# Patient Record
Sex: FEMALE | Race: BLACK OR AFRICAN AMERICAN | Employment: FULL TIME | URBAN - METROPOLITAN AREA
[De-identification: names, ages, dates, MRNs, and addresses within clinical notes are randomized per-mention and may not be internally consistent; named-entity substitution may affect disease eponyms.]

---

## 2017-01-26 ENCOUNTER — OFFICE VISIT (OUTPATIENT)
Dept: FAMILY MEDICINE CLINIC | Age: 57
End: 2017-01-26

## 2017-01-26 VITALS
HEART RATE: 86 BPM | DIASTOLIC BLOOD PRESSURE: 78 MMHG | WEIGHT: 206 LBS | TEMPERATURE: 98.3 F | BODY MASS INDEX: 35.17 KG/M2 | HEIGHT: 64 IN | SYSTOLIC BLOOD PRESSURE: 134 MMHG

## 2017-01-26 DIAGNOSIS — I10 ESSENTIAL HYPERTENSION WITH GOAL BLOOD PRESSURE LESS THAN 130/85: ICD-10-CM

## 2017-01-26 DIAGNOSIS — J45.909 UNCOMPLICATED ASTHMA, UNSPECIFIED ASTHMA SEVERITY: Primary | ICD-10-CM

## 2017-01-26 DIAGNOSIS — E03.9 ACQUIRED HYPOTHYROIDISM: ICD-10-CM

## 2017-01-26 RX ORDER — ALBUTEROL SULFATE 90 UG/1
1 AEROSOL, METERED RESPIRATORY (INHALATION)
Qty: 1 INHALER | Refills: 3 | Status: SHIPPED | OUTPATIENT
Start: 2017-01-26 | End: 2017-03-30 | Stop reason: SDUPTHER

## 2017-01-26 RX ORDER — AMLODIPINE BESYLATE 10 MG/1
TABLET ORAL
Qty: 90 TAB | Refills: 0 | Status: SHIPPED | OUTPATIENT
Start: 2017-01-26 | End: 2017-03-30 | Stop reason: SDUPTHER

## 2017-01-26 NOTE — PROGRESS NOTES
HISTORY OF PRESENT ILLNESS  Kanchan Newell is a 64 y.o. female. HPI Comments: 1. Asthma-- using albuterol bid, on last canister of amBX and lost her insurance. 2.  Htn, taking med as directed. 3.  Hypothyroidism, taking med as directed. Hypertension      Thyroid Problem         ROS    Physical Exam   Constitutional: She appears well-developed and well-nourished. No distress. Wt. up   Neck: No thyromegaly present. Cardiovascular: Normal rate, regular rhythm and normal heart sounds. Exam reveals no gallop and no friction rub. No murmur heard. Pulmonary/Chest: Breath sounds normal.   Musculoskeletal: She exhibits no edema. ASSESSMENT and PLAN  htn-- continue current, good control. F/u 3 months, work on wt loss. Asthma-- will get preventive from [facility] now-- change to advair, and will get albuterol through [facility] also. Hypothyroidism-- doesn't need blood draw yet.

## 2017-01-26 NOTE — PROGRESS NOTES
Coordination of Care  1. Have you been to the ER, urgent care clinic since your last visit? Hospitalized since your last visit? No    2. Have you seen or consulted any other health care providers outside of the [address] [place] [address] since your last visit? Include any pap smears or colon screening. No    Medications  Medication Reconciliation Performed: No  Patient does need refills     Learning Assessment Complete?  yes

## 2017-01-26 NOTE — ACP (ADVANCE CARE PLANNING)
Discussed and recommended Honoring Choices. She is interested in participating along with a sister who lives in another state. Will have [employer] contact her.

## 2017-02-01 ENCOUNTER — TELEPHONE (OUTPATIENT)
Dept: FAMILY MEDICINE CLINIC | Age: 57
End: 2017-02-01

## 2017-03-20 RX ORDER — MOMETASONE FUROATE AND FORMOTEROL FUMARATE DIHYDRATE 100; 5 UG/1; UG/1
AEROSOL RESPIRATORY (INHALATION)
Qty: 1 INHALER | Refills: 11 | Status: SHIPPED | OUTPATIENT
Start: 2017-03-20 | End: 2017-03-30 | Stop reason: SDUPTHER

## 2017-03-30 ENCOUNTER — OFFICE VISIT (OUTPATIENT)
Dept: FAMILY MEDICINE CLINIC | Age: 57
End: 2017-03-30

## 2017-03-30 VITALS
DIASTOLIC BLOOD PRESSURE: 79 MMHG | WEIGHT: 211 LBS | TEMPERATURE: 98.1 F | HEART RATE: 74 BPM | BODY MASS INDEX: 36.02 KG/M2 | SYSTOLIC BLOOD PRESSURE: 132 MMHG | HEIGHT: 64 IN

## 2017-03-30 DIAGNOSIS — J45.909 UNCOMPLICATED ASTHMA, UNSPECIFIED ASTHMA SEVERITY: ICD-10-CM

## 2017-03-30 DIAGNOSIS — L30.8 OTHER ECZEMA: Primary | ICD-10-CM

## 2017-03-30 DIAGNOSIS — I10 ESSENTIAL HYPERTENSION WITH GOAL BLOOD PRESSURE LESS THAN 130/85: ICD-10-CM

## 2017-03-30 DIAGNOSIS — E03.9 ACQUIRED HYPOTHYROIDISM: ICD-10-CM

## 2017-03-30 RX ORDER — ALBUTEROL SULFATE 0.83 MG/ML
2.5 SOLUTION RESPIRATORY (INHALATION)
Qty: 24 EACH | Refills: 0 | Status: SHIPPED | OUTPATIENT
Start: 2017-03-30

## 2017-03-30 RX ORDER — AMLODIPINE BESYLATE 10 MG/1
TABLET ORAL
Qty: 90 TAB | Refills: 3 | Status: SHIPPED | OUTPATIENT
Start: 2017-03-30 | End: 2018-04-28 | Stop reason: SDUPTHER

## 2017-03-30 RX ORDER — ALBUTEROL SULFATE 90 UG/1
1 AEROSOL, METERED RESPIRATORY (INHALATION)
Qty: 1 INHALER | Refills: 3 | Status: SHIPPED | OUTPATIENT
Start: 2017-03-30 | End: 2020-08-06 | Stop reason: SDUPTHER

## 2017-03-30 RX ORDER — TRIAMCINOLONE ACETONIDE 0.25 MG/G
CREAM TOPICAL 2 TIMES DAILY
Qty: 15 G | Refills: 5 | Status: SHIPPED | OUTPATIENT
Start: 2017-03-30 | End: 2018-05-08 | Stop reason: SDUPTHER

## 2017-03-30 RX ORDER — LEVOTHYROXINE SODIUM 88 UG/1
TABLET ORAL
Qty: 90 TAB | Refills: 3 | Status: SHIPPED | OUTPATIENT
Start: 2017-03-30 | End: 2018-04-03 | Stop reason: SDUPTHER

## 2017-03-30 RX ORDER — FLUOCINONIDE 0.5 MG/G
CREAM TOPICAL 2 TIMES DAILY
Qty: 60 G | Refills: 1 | Status: SHIPPED | OUTPATIENT
Start: 2017-03-30 | End: 2017-06-01 | Stop reason: SDUPTHER

## 2017-03-30 RX ORDER — ALBUTEROL SULFATE 90 UG/1
1 AEROSOL, METERED RESPIRATORY (INHALATION)
Qty: 1 INHALER | Refills: 5 | Status: SHIPPED | OUTPATIENT
Start: 2017-03-30 | End: 2020-06-18 | Stop reason: SDUPTHER

## 2017-03-30 RX ORDER — MOMETASONE FUROATE 1 MG/G
CREAM TOPICAL
Qty: 60 G | Refills: 1 | Status: SHIPPED | OUTPATIENT
Start: 2017-03-30 | End: 2020-08-06 | Stop reason: SDUPTHER

## 2017-03-30 NOTE — PROGRESS NOTES
HISTORY OF PRESENT ILLNESS  Fili Kingsley is a 62 y.o. female. HPI Comments: Having some difficulties with her asthma, and is almost completely out of Dulera. Would like to be switched to Advair when possible. Using meds correctly. Has used albuterol once today. Has been unable to get into [facility] to refill steroid creams. Uses lidex per derm on trunk, triamcinolone on area outside vagina prn, and elocon occas on face with breakout. No current skin problems. Is sensitive to a lot of foods and trying to avoid these. Asthma     Hypertension          ROS    Physical Exam   Constitutional: She appears well-developed and well-nourished. No distress. Wt up from last visit. Neck: No thyromegaly present. Cardiovascular: Normal rate, regular rhythm and normal heart sounds. Pulmonary/Chest: No respiratory distress. She has wheezes. She has no rales. She exhibits no tenderness. Musculoskeletal: She exhibits no edema. ASSESSMENT and PLAN  1. Asthma-- get albuterol and dulera through [facility], pt to fill out TPC form and then will switch from Zhang Brightly to Advair. 2.  Eczema/Atopy-- refilled meds. To continue to try to reach [facility]. 3.  htn and hypothyroidism stable. 4. HM-- needs wt loss, needs [facility] for pap.

## 2017-03-30 NOTE — PROGRESS NOTES
[facility] pharmacy handout given and explained to patient. Patient plans to  at [facility] location, requesting stat fill of Rx as requested by Dr Gabriela Helms which was noted on fax cover sheet. F/S faxed to [facility]. New Nebulizer tubing kit given to pt per provider.

## 2017-03-30 NOTE — PROGRESS NOTES
Coordination of Care  1. Have you been to the ER, urgent care clinic since your last visit? Hospitalized since your last visit? No    2. Have you seen or consulted any other health care providers outside of the [facility] since your last visit? Include any pap smears or colon screening. No    Medications  Medication Reconciliation Performed: yes  Patient does need refills     Learning Assessment Complete?  yes

## 2017-06-01 ENCOUNTER — OFFICE VISIT (OUTPATIENT)
Dept: FAMILY MEDICINE CLINIC | Age: 57
End: 2017-06-01

## 2017-06-01 VITALS
BODY MASS INDEX: 37.51 KG/M2 | DIASTOLIC BLOOD PRESSURE: 79 MMHG | WEIGHT: 217 LBS | HEART RATE: 92 BPM | SYSTOLIC BLOOD PRESSURE: 143 MMHG | TEMPERATURE: 98.2 F

## 2017-06-01 DIAGNOSIS — L20.9 ATOPIC DERMATITIS, UNSPECIFIED TYPE: Primary | ICD-10-CM

## 2017-06-01 DIAGNOSIS — L03.113 CELLULITIS OF RIGHT UPPER EXTREMITY: ICD-10-CM

## 2017-06-01 RX ORDER — PREDNISONE 10 MG/1
TABLET ORAL
Qty: 21 TAB | Refills: 0 | Status: SHIPPED | OUTPATIENT
Start: 2017-06-01 | End: 2017-07-20

## 2017-06-01 RX ORDER — DOXYCYCLINE 100 MG/1
100 TABLET ORAL 2 TIMES DAILY
Qty: 14 TAB | Refills: 0 | Status: SHIPPED | OUTPATIENT
Start: 2017-06-01 | End: 2017-06-08

## 2017-06-01 RX ORDER — FLUOCINONIDE 0.5 MG/G
CREAM TOPICAL 2 TIMES DAILY
Qty: 60 G | Refills: 3 | Status: SHIPPED | OUTPATIENT
Start: 2017-06-01 | End: 2020-06-18

## 2017-06-01 RX ORDER — MUPIROCIN 20 MG/G
OINTMENT TOPICAL DAILY
Qty: 22 G | Refills: 1 | Status: SHIPPED | OUTPATIENT
Start: 2017-06-01 | End: 2018-04-02

## 2017-06-01 NOTE — MR AVS SNAPSHOT
Visit Information Date & Time Provider Department Dept. Phone Encounter #  
 6/1/2017  1:35 PM Matthew Yee [facility] [phone] [id number] Follow-up Instructions Return if symptoms worsen or fail to improve. Upcoming Health Maintenance Date Due FOBT Q 1 YEAR AGE 50-75 8/1/2017 INFLUENZA AGE 9 TO ADULT 8/1/2017 PAP AKA CERVICAL CYTOLOGY 11/25/2017 BREAST CANCER SCRN MAMMOGRAM 12/14/2018 DTaP/Tdap/Td series (2 - Td) 1/1/2025 Allergies as of 6/1/2017  Review Complete On: 6/1/2017 By: Matthew Yee NP Severity Noted Reaction Type Reactions Latex  03/20/2015    Rash Seafood [Shellfish Containing Products] High 02/23/2011    Angioedema THROAT SWELLS Tree Nut High 06/13/2013    Anaphylaxis Throat swelling Lisinopril Medium 02/18/2011    Not Reported This Time LIPS SWELL Bactrim [Sulfamethoprim Ds]  02/23/2011    Other (comments) BLISTERS ON HANDS AND FEET Banana  11/26/2014   Topical Other (comments), Angioedema  
 mouth became red and itchy. Iodine  03/02/2011   Side Effect Other (comments) Created bumps, soreness-betadine Lisinopril  11/25/2010    Swelling Angioedema on 11/25/2010 Povidone  07/26/2013    Rash  
 Sulfa (Sulfonamide Antibiotics)  02/18/2011   Side Effect Rash, Itching Current Immunizations  Reviewed on 2/18/2014 Name Date Influenza High Dose Vaccine PF 9/1/2015 Influenza Vaccine Whole 11/6/2009 Pneumococcal Polysaccharide (PPSV-23) 9/24/2014 11:40 AM  
  
 Not reviewed this visit You Were Diagnosed With   
  
 Codes Comments Atopic dermatitis, unspecified type    -  Primary ICD-10-CM: L20.9 ICD-9-CM: 691.8 Cellulitis of right upper extremity     ICD-10-CM: L03.113 ICD-9-CM: 270. 3 Vitals  BP Pulse Temp Weight(growth percentile) LMP BMI  
 143/79 (BP 1 Location: Left arm, BP Patient Position: Sitting) 92 98.2 °F (36.8 °C) (Oral) 217 lb (98.4 kg) 10/20/2014 37.51 kg/m2 OB Status Smoking Status Postmenopausal Never Smoker BMI and BSA Data Body Mass Index Body Surface Area  
 37.51 kg/m 2 2.1 m 2 Preferred Pharmacy Pharmacy Name Phone [facility] [address] [place], [place] - [address] [place] [address] Adriana De La Fuente [phone] Your Updated Medication List  
  
   
This list is accurate as of: 6/1/17  2:38 PM.  Always use your most recent med list.  
  
  
  
  
 * albuterol 90 mcg/actuation inhaler Commonly known as:  VENTOLIN HFA Take 1 Puff by inhalation every six (6) hours as needed for Wheezing. * albuterol 2.5 mg /3 mL (0.083 %) nebulizer solution Commonly known as:  PROVENTIL VENTOLIN  
3 mL by Nebulization route every four (4) hours as needed for Wheezing. * albuterol 90 mcg/actuation inhaler Commonly known as:  VENTOLIN HFA Take 1 Puff by inhalation every four (4) hours as needed for Wheezing. Ventolin so her insurance will cover. amLODIPine 10 mg tablet Commonly known as:  Tamyard Kyra TAKE ONE TABLET BY MOUTH DAILY FOR HYPERTENSION  
  
 doxycycline 100 mg tablet Commonly known as:  ADOXA Take 1 Tab by mouth two (2) times a day for 7 days. fluocinoNIDE 0.05 % topical cream  
Commonly known as:  LIDEX Apply  to affected area two (2) times a day. inhalational spacing device Commonly known as:  POCKET CHAMBER Use as directed with albuterol inhaler prn  
  
 levothyroxine 88 mcg tablet Commonly known as:  SYNTHROID  
TAKE ONE TABLET BY MOUTH ONCE DAILY  
  
 mometasone 0.1 % topical cream  
Commonly known as:  Caridad Kellogg Apply as directed. mometasone-formoterol 100-5 mcg/actuation HFA inhaler Commonly known as:  Shalom Jane INHALE TWO PUFFS BY MOUTH TWICE DAILY  
  
 montelukast 10 mg tablet Commonly known as:  SINGULAIR  
TAKE ONE TABLET BY MOUTH ONCE DAILY MULTIPLE VITAMIN PO Take 1 Tab by mouth daily (with breakfast). mupirocin 2 % ointment Commonly known as:  [employer] Apply  to affected area daily. naproxen 500 mg tablet Commonly known as:  NAPROSYN Take 1 Tab by mouth two (2) times daily (with meals). Prn pain * NASACORT AQ 55 mcg nasal inhaler Generic drug:  triamcinolone 2 Sprays daily. * triamcinolone acetonide 0.025 % topical cream  
Commonly known as:  KENALOG Apply  to affected area two (2) times a day. use thin layer  
  
 predniSONE 10 mg tablet Commonly known as:  Sofía Colla Take 20mg PO BID x 3 days then 10mg PO BID x 3 days then 10mg PO daily x 3 days then stop  
  
 vit B Cmplx 3-FA-Vit C-Biotin 1- mg-mg-mcg tablet Commonly known as:  NEPHRO JEFFERY RX Take 1 Tab by mouth daily. ZyrTEC 10 mg tablet Generic drug:  cetirizine Take  by mouth. * Notice: This list has 5 medication(s) that are the same as other medications prescribed for you. Read the directions carefully, and ask your doctor or other care provider to review them with you. Prescriptions Sent to Pharmacy Refills  
 mupirocin (BACTROBAN) 2 % ointment 1 Sig: Apply  to affected area daily. Class: Normal  
 Pharmacy: [address] Ph #: [phone] Route: Topical  
 doxycycline (ADOXA) 100 mg tablet 0 Sig: Take 1 Tab by mouth two (2) times a day for 7 days. Class: Normal  
 Pharmacy: [address] Ph #: [phone] Route: Oral  
 predniSONE (DELTASONE) 10 mg tablet 0 Sig: Take 20mg PO BID x 3 days then 10mg PO BID x 3 days then 10mg PO daily x 3 days then stop Class: Normal  
 Pharmacy: [address] [place] [address] Ph #: [phone]  
 fluocinoNIDE (LIDEX) 0.05 % topical cream 3 Sig: Apply  to affected area two (2) times a day.   
 Class: Normal  
 Pharmacy: [address], [address] Ph #: [phone] Route: Topical  
  
Follow-up Instructions Return if symptoms worsen or fail to improve. Patient Instructions Cellulitis: Care Instructions Your Care Instructions Cellulitis is a skin infection. It often occurs after a break in the skin from a scrape, cut, bite, or puncture, or after a rash. The doctor has checked you carefully, but problems can develop later. If you notice any problems or new symptoms, get medical treatment right away. Follow-up care is a key part of your treatment and safety. Be sure to make and go to all appointments, and call your doctor if you are having problems. It's also a good idea to know your test results and keep a list of the medicines you take. How can you care for yourself at home? · Take your antibiotics as directed. Do not stop taking them just because you feel better. You need to take the full course of antibiotics. · Prop up the infected area on pillows to reduce pain and swelling. Try to keep the area above the level of your heart as often as you can. · If your doctor told you how to care for your wound, follow your doctor's instructions. If you did not get instructions, follow this general advice: ¨ Wash the wound with clean water 2 times a day. Don't use hydrogen peroxide or alcohol, which can slow healing. ¨ You may cover the wound with a thin layer of petroleum jelly, such as Vaseline, and a nonstick bandage. ¨ Apply more petroleum jelly and replace the bandage as needed. · Be safe with medicines. Take pain medicines exactly as directed. ¨ If the doctor gave you a prescription medicine for pain, take it as prescribed. ¨ If you are not taking a prescription pain medicine, ask your doctor if you can take an over-the-counter medicine. To prevent cellulitis in the future · Try to prevent cuts, scrapes, or other injuries to your skin. Cellulitis most often occurs where there is a break in the skin. · If you get a scrape, cut, mild burn, or bite, wash the wound with clean water as soon as you can to help avoid infection. Don't use hydrogen peroxide or alcohol, which can slow healing. · If you have swelling in your legs (edema), support stockings and good skin care may help prevent leg sores and cellulitis. · Take care of your feet, especially if you have diabetes or other conditions that increase the risk of infection. Wear shoes and socks. Do not go barefoot. If you have athlete's foot or other skin problems on your feet, talk to your doctor about how to treat them. When should you call for help? Call your doctor now or seek immediate medical care if: 
· You have signs that your infection is getting worse, such as: 
¨ Increased pain, swelling, warmth, or redness. ¨ Red streaks leading from the area. ¨ Pus draining from the area. ¨ A fever. · You get a rash. Watch closely for changes in your health, and be sure to contact your doctor if: 
· You are not getting better after 1 day (24 hours). · You do not get better as expected. Where can you learn more? Go to http://suellenWalkSourceneena.info/. Dayron Canela in the search box to learn more about \"Cellulitis: Care Instructions. \" Current as of: October 13, 2016 Content Version: 11.2 © 8404-5061 [employer]. Care instructions adapted under license by [employer] (which disclaims liability or warranty for this information). If you have questions about a medical condition or this instruction, always ask your healthcare professional. [place] [address] any warranty or liability for your use of this information. Introducing [facility]! Cinthia Crisostomo introduces [employer] patient portal. Now you can access parts of your medical record, email your doctor's office, and request medication refills online. 1. In your internet browser, go to [email or web address] 2. Click on the First Time User? Click Here link in the Sign In box. You will see the New Member Sign Up page. 3. Enter your PetHub Access Code exactly as it appears below. You will not need to use this code after youve completed the sign-up process. If you do not sign up before the expiration date, you must request a new code. · PetHub Access Code: [email or web address] Expires: 8/30/2017  2:38 PM 
 
4. Enter the last four digits of your Social Security Number (xxxx) and Date of Birth (mm/dd/yyyy) as indicated and click Submit. You will be taken to the next sign-up page. 5. Create a PetHub ID. This will be your PetHub login ID and cannot be changed, so think of one that is secure and easy to remember. 6. Create a PetHub password. You can change your password at any time. 7. Enter your Password Reset Question and Answer. This can be used at a later time if you forget your password. 8. Enter your e-mail address. You will receive e-mail notification when new information is available in [address] 9. Click Sign Up. You can now view and download portions of your medical record. 10. Click the Download Summary menu link to download a portable copy of your medical information. If you have questions, please visit the Frequently Asked Questions section of the [employer] website. Remember, PetHub is NOT to be used for urgent needs. For medical emergencies, dial 911. Now available from your iPhone and Android! Please provide this summary of care documentation to your next provider. Your primary care clinician is listed as Choco Kelly. If you have any questions after today's visit, please call [phone].

## 2017-06-01 NOTE — PATIENT INSTRUCTIONS

## 2017-06-01 NOTE — PROGRESS NOTES
Subjective:     Chief Complaint   Patient presents with    Lesion     skin lesion under right arm x 2 weeks        She  is a 62 y.o. female who presents for evaluation of  Possible cellulitis on R arm x 2 weeks. Pt notes she scratched one of her ezcema sites on her posterior R upper arm. Pt put a band aid on the site which made is worse r/t latex allergy and the skin peeled. Has been attempted relief with peroxide     + intermittent clear and yellow drainage. + warmth. Has increased in size since onset. Has been unable to see [facility] derm r/t outstanding bill. ROS  Gen - no fever/chills  Resp - no dyspnea or cough  CV - no chest pain or PAGAN  Rest per HPI    Past Medical History:   Diagnosis Date    Abnormal uterine bleeding     Asthma     Hayfever     History of mammogram 2013    History of mammography, screening 2013    Hypertension     Iron deficiency anemia     Other ill-defined conditions 1981    blood transfusion hx--vaginal delivery    Pap smear for cervical cancer screening     normal    Pap smear for cervical cancer screening     Sinus disease     Thyroid disease     HYPOTHYROID    Unspecified adverse effect of anesthesia     HAD TO FORCE MOUTH OPEN WHEN PT SEDATED     Past Surgical History:   Procedure Laterality Date    HX BREAST BIOPSY Right     6 yrs ago--neg    HX BREAST LUMPECTOMY  2013    BREAST DUCTAL EXCISION performed by Cathi Graves MD at [facility] MAIN OR    HX  SECTION      HX GI      HX GYN      ablation,diagnostic lap.  HX HEENT      WISDOM TEETH    HX TUBAL LIGATION       Current Outpatient Prescriptions on File Prior to Visit   Medication Sig Dispense Refill    fluocinoNIDE (LIDEX) 0.05 % topical cream Apply  to affected area two (2) times a day. 60 g 1    triamcinolone acetonide (KENALOG) 0.025 % topical cream Apply  to affected area two (2) times a day.  use thin layer 15 g 5    amLODIPine (NORVASC) 10 mg tablet TAKE ONE TABLET BY MOUTH DAILY FOR HYPERTENSION 90 Tab 3    albuterol (VENTOLIN HFA) 90 mcg/actuation inhaler Take 1 Puff by inhalation every six (6) hours as needed for Wheezing. 1 Inhaler 3    mometasone (ELOCON) 0.1 % topical cream Apply as directed. 60 g 1    albuterol (PROVENTIL VENTOLIN) 2.5 mg /3 mL (0.083 %) nebulizer solution 3 mL by Nebulization route every four (4) hours as needed for Wheezing. 24 Each 0    levothyroxine (SYNTHROID) 88 mcg tablet TAKE ONE TABLET BY MOUTH ONCE DAILY 90 Tab 3    mometasone-formoterol (DULERA) 100-5 mcg/actuation HFA inhaler INHALE TWO PUFFS BY MOUTH TWICE DAILY 1 Inhaler 11    albuterol (VENTOLIN HFA) 90 mcg/actuation inhaler Take 1 Puff by inhalation every four (4) hours as needed for Wheezing. Ventolin so her insurance will cover. 1 Inhaler 5    montelukast (SINGULAIR) 10 mg tablet TAKE ONE TABLET BY MOUTH ONCE DAILY 30 Tab 11    naproxen (NAPROSYN) 500 mg tablet Take 1 Tab by mouth two (2) times daily (with meals). Prn pain 60 Tab 2    inhalational spacing device (POCKET CHAMBER) Use as directed with albuterol inhaler prn 1 Device 0    cetirizine (ZYRTEC) 10 mg tablet Take  by mouth.  triamcinolone (NASACORT AQ) 55 mcg nasal inhaler 2 Sprays daily.  vit B Cmplx 3-FA-Vit C-Biotin (NEPHRO JEFFERY RX) 1- mg-mg-mcg tablet Take 1 Tab by mouth daily.  MULTIVITAMIN (MULTIPLE VITAMIN PO) Take 1 Tab by mouth daily (with breakfast). No current facility-administered medications on file prior to visit.          Objective:     Vitals:    06/01/17 1322   BP: 143/79   Pulse: 92   Temp: 98.2 °F (36.8 °C)   TempSrc: Oral   Weight: 217 lb (98.4 kg)       Physical Examination:  General appearance - alert, well appearing, and in no distress  Eyes -sclera anicteric  Neck - supple, no significant adenopathy, no thyromegaly  Chest - clear to auscultation, no wheezes, rales or rhonchi, symmetric air entry  Heart - normal rate, regular rhythm, normal S1, S2, no murmurs, rubs, clicks or gallops  Neurological - alert, oriented, no focal findings or movement disorder noted  R post arm- approx 2cm x 2cm erythematous open area, warm to touch, no visible discharge      Assessment/ Plan:   Salina Acevedo was seen today for lesion. Diagnoses and all orders for this visit:    Atopic dermatitis, unspecified type  -     predniSONE (DELTASONE) 10 mg tablet; Take 20mg PO BID x 3 days then 10mg PO BID x 3 days then 10mg PO daily x 3 days then stop  -     fluocinoNIDE (LIDEX) 0.05 % topical cream; Apply  to affected area two (2) times a day. Cellulitis of right upper extremity  -     mupirocin (BACTROBAN) 2 % ointment; Apply  to affected area daily. -     doxycycline (ADOXA) 100 mg tablet; Take 1 Tab by mouth two (2) times a day for 7 days. Repeat steroid taper for eczema control. Refill topical steroids per Pt request.     Start PO and topical Abx. Discussed wound care and apply ointment during day while being covered. Soaks at bedtime in warm water with epsom salt. Discussed S&S of worsening cellulitis. I have discussed the diagnosis with the patient and the intended plan as seen in the above orders. The patient has received an after-visit summary and questions were answered concerning future plans. I have discussed medication side effects and warnings with the patient as well. The patient verbalizes understanding and agreement with the plan. Follow-up Disposition:  Return if symptoms worsen or fail to improve.

## 2017-06-01 NOTE — PROGRESS NOTES
Coordination of Care  1. Have you been to the ER, urgent care clinic since your last visit? Hospitalized since your last visit? No    2. Have you seen or consulted any other health care providers outside of the [address] [place] [address] since your last visit? Include any pap smears or colon screening. No    Medications  Medication Reconciliation Performed: no  Patient does need refills     Learning Assessment Complete?  yes

## 2017-07-20 ENCOUNTER — HOSPITAL ENCOUNTER (OUTPATIENT)
Dept: LAB | Age: 57
Discharge: HOME | End: 2017-07-20

## 2017-07-20 ENCOUNTER — OFFICE VISIT (OUTPATIENT)
Dept: FAMILY MEDICINE CLINIC | Age: 57
End: 2017-07-20

## 2017-07-20 VITALS
HEART RATE: 87 BPM | SYSTOLIC BLOOD PRESSURE: 129 MMHG | DIASTOLIC BLOOD PRESSURE: 75 MMHG | BODY MASS INDEX: 38.02 KG/M2 | TEMPERATURE: 97.9 F | WEIGHT: 220 LBS

## 2017-07-20 DIAGNOSIS — I10 ESSENTIAL HYPERTENSION WITH GOAL BLOOD PRESSURE LESS THAN 130/85: ICD-10-CM

## 2017-07-20 DIAGNOSIS — J45.909 UNCOMPLICATED ASTHMA, UNSPECIFIED ASTHMA SEVERITY: ICD-10-CM

## 2017-07-20 DIAGNOSIS — E03.9 ACQUIRED HYPOTHYROIDISM: ICD-10-CM

## 2017-07-20 DIAGNOSIS — R60.0 LOCALIZED EDEMA: Primary | ICD-10-CM

## 2017-07-20 LAB
ALBUMIN SERPL BCP-MCNC: 3.7 G/DL (ref 3.5–5)
ALBUMIN/GLOB SERPL: 1.1 {RATIO} (ref 1.1–2.2)
ALP SERPL-CCNC: 115 U/L (ref 45–117)
ALT SERPL-CCNC: 17 U/L (ref 12–78)
ANION GAP BLD CALC-SCNC: 5 MMOL/L (ref 5–15)
AST SERPL W P-5'-P-CCNC: 15 U/L (ref 15–37)
BILIRUB SERPL-MCNC: 0.4 MG/DL (ref 0.2–1)
BILIRUB UR QL STRIP: NEGATIVE
BUN SERPL-MCNC: 12 MG/DL (ref 6–20)
BUN/CREAT SERPL: 15 (ref 12–20)
CALCIUM SERPL-MCNC: 8.6 MG/DL (ref 8.5–10.1)
CHLORIDE SERPL-SCNC: 103 MMOL/L (ref 97–108)
CO2 SERPL-SCNC: 31 MMOL/L (ref 21–32)
CREAT SERPL-MCNC: 0.82 MG/DL (ref 0.55–1.02)
GLOBULIN SER CALC-MCNC: 3.5 G/DL (ref 2–4)
GLUCOSE SERPL-MCNC: 80 MG/DL (ref 65–100)
GLUCOSE UR-MCNC: NEGATIVE MG/DL
KETONES P FAST UR STRIP-MCNC: NEGATIVE MG/DL
PH UR STRIP: 6 [PH] (ref 4.6–8)
POTASSIUM SERPL-SCNC: 3.6 MMOL/L (ref 3.5–5.1)
PROT SERPL-MCNC: 7.2 G/DL (ref 6.4–8.2)
PROT UR QL STRIP: NEGATIVE MG/DL
SODIUM SERPL-SCNC: 139 MMOL/L (ref 136–145)
SP GR UR STRIP: 1.02 (ref 1–1.03)
TSH SERPL DL<=0.05 MIU/L-ACNC: 0.86 UIU/ML (ref 0.36–3.74)
UA UROBILINOGEN AMB POC: NORMAL (ref 0.2–1)
URINALYSIS CLARITY POC: NORMAL
URINALYSIS COLOR POC: YELLOW
URINE BLOOD POC: NEGATIVE
URINE LEUKOCYTES POC: NORMAL
URINE NITRITES POC: NEGATIVE

## 2017-07-20 PROCEDURE — 80053 COMPREHEN METABOLIC PANEL: CPT | Performed by: FAMILY MEDICINE

## 2017-07-20 PROCEDURE — 84443 ASSAY THYROID STIM HORMONE: CPT | Performed by: FAMILY MEDICINE

## 2017-07-20 NOTE — PROGRESS NOTES
HISTORY OF PRESENT ILLNESS  Stephanie Lui is a 62 y.o. female. HPI Comments: Reports her feet are swelling a lot lately, worse by the end of the day. Works several jobs, 1 is standing all day, the other involves moving all day. No cp, orthop, pnd.  Is gaining weight. Reports eating out a lot. Asthma fairly stable, lives in a moldy apt, and just got dehumidifier yesterday. Using albuterol bid. Swelling         ROS    Physical Exam   Constitutional: She appears well-developed and well-nourished. No distress. High BMI. Weight up 10-15 lb over the past year. Neck: No thyromegaly present. Cardiovascular: Normal rate, regular rhythm and normal heart sounds. Exam reveals no gallop and no friction rub. No murmur heard. Pulmonary/Chest: Breath sounds normal.   Abdominal: Soft. She exhibits no distension and no mass. There is no guarding. No hepatomegaly. Musculoskeletal: She exhibits edema. Trace-1+edema lower calves. UA neg for protein  ASSESSMENT and PLAN  Edema, likely physiologic.  rec lowering Na in diet, wt. Loss. Check renal and liver function. Check tsh to make sure l-thyroxine dose is keeping her euthyroid. If no reason found and sx are really bothering her, could switch off amlodipine. Htn, continue amlodipine for now. Asthma/allergies. Continue current. Benton Kwan

## 2017-09-15 ENCOUNTER — DOCUMENTATION ONLY (OUTPATIENT)
Dept: FAMILY MEDICINE CLINIC | Age: 57
End: 2017-09-15

## 2017-09-15 NOTE — PROGRESS NOTES
Pt was contacted on 8/10/17 and LM for her to call regarding [facility]. if she would like to schedule appointment.  James Dowling RN

## 2017-10-11 ENCOUNTER — CLINICAL SUPPORT (OUTPATIENT)
Dept: FAMILY MEDICINE CLINIC | Age: 57
End: 2017-10-11

## 2017-10-11 DIAGNOSIS — Z23 ENCOUNTER FOR IMMUNIZATION: Primary | ICD-10-CM

## 2017-12-08 ENCOUNTER — DOCUMENTATION ONLY (OUTPATIENT)
Dept: FAMILY MEDICINE CLINIC | Age: 57
End: 2017-12-08

## 2017-12-08 NOTE — PROGRESS NOTES
Mailed pt letter today as follow up to [facility]. invite.  Rita Perez RN

## 2018-05-07 RX ORDER — MOMETASONE FUROATE AND FORMOTEROL FUMARATE DIHYDRATE 100; 5 UG/1; UG/1
AEROSOL RESPIRATORY (INHALATION)
Qty: 1 INHALER | Refills: 1 | Status: SHIPPED | OUTPATIENT
Start: 2018-05-07 | End: 2020-06-18

## 2018-05-10 ENCOUNTER — HOSPITAL ENCOUNTER (OUTPATIENT)
Dept: MAMMOGRAPHY | Age: 58
Discharge: HOME | End: 2018-05-10
Attending: NURSE PRACTITIONER

## 2018-05-10 DIAGNOSIS — Z12.39 SCREENING BREAST EXAMINATION: ICD-10-CM

## 2018-05-10 PROCEDURE — 77067 SCR MAMMO BI INCL CAD: CPT

## 2018-05-29 RX ORDER — AMLODIPINE BESYLATE 10 MG/1
TABLET ORAL
Qty: 30 TAB | Refills: 0 | OUTPATIENT
Start: 2018-05-29

## 2019-09-18 ENCOUNTER — HOSPITAL ENCOUNTER (OUTPATIENT)
Dept: MAMMOGRAPHY | Age: 59
Discharge: HOME | End: 2019-09-18
Attending: FAMILY MEDICINE
Payer: COMMERCIAL

## 2019-09-18 DIAGNOSIS — Z12.39 BREAST SCREENING: ICD-10-CM

## 2019-09-18 PROCEDURE — 77067 SCR MAMMO BI INCL CAD: CPT

## 2020-06-18 ENCOUNTER — VIRTUAL VISIT (OUTPATIENT)
Dept: INTERNAL MEDICINE CLINIC | Facility: CLINIC | Age: 60
End: 2020-06-18

## 2020-06-18 DIAGNOSIS — E83.42 HYPOMAGNESEMIA: ICD-10-CM

## 2020-06-18 DIAGNOSIS — E03.9 ACQUIRED HYPOTHYROIDISM: ICD-10-CM

## 2020-06-18 DIAGNOSIS — E55.9 VITAMIN D DEFICIENCY: ICD-10-CM

## 2020-06-18 DIAGNOSIS — J45.40 MODERATE PERSISTENT ASTHMA WITHOUT COMPLICATION: ICD-10-CM

## 2020-06-18 DIAGNOSIS — Z13.1 SCREENING FOR DIABETES MELLITUS: ICD-10-CM

## 2020-06-18 DIAGNOSIS — J30.1 SEASONAL ALLERGIC RHINITIS DUE TO POLLEN: ICD-10-CM

## 2020-06-18 DIAGNOSIS — L20.9 ATOPIC DERMATITIS, UNSPECIFIED TYPE: ICD-10-CM

## 2020-06-18 DIAGNOSIS — I10 ESSENTIAL HYPERTENSION: Primary | ICD-10-CM

## 2020-06-18 DIAGNOSIS — Z13.220 SCREENING, LIPID: ICD-10-CM

## 2020-06-18 DIAGNOSIS — Z12.4 SCREENING FOR CERVICAL CANCER: ICD-10-CM

## 2020-06-18 RX ORDER — LANOLIN ALCOHOL/MO/W.PET/CERES
400 CREAM (GRAM) TOPICAL DAILY
COMMUNITY
End: 2021-04-27

## 2020-06-18 RX ORDER — BUDESONIDE AND FORMOTEROL FUMARATE DIHYDRATE 160; 4.5 UG/1; UG/1
AEROSOL RESPIRATORY (INHALATION)
COMMUNITY
Start: 2020-06-03 | End: 2021-02-01 | Stop reason: SDUPTHER

## 2020-06-18 RX ORDER — GLUCOSAMINE/CHONDR SU A SOD 750-600 MG
2500 TABLET ORAL DAILY
COMMUNITY
End: 2021-10-27 | Stop reason: ALTCHOICE

## 2020-06-18 RX ORDER — AMLODIPINE BESYLATE 10 MG/1
TABLET ORAL
Qty: 90 TAB | Refills: 1 | Status: SHIPPED | OUTPATIENT
Start: 2020-06-18 | End: 2021-04-12 | Stop reason: SDUPTHER

## 2020-06-18 RX ORDER — LEVOTHYROXINE SODIUM 75 UG/1
75 TABLET ORAL
COMMUNITY
End: 2020-07-02 | Stop reason: DRUGHIGH

## 2020-06-18 RX ORDER — MONTELUKAST SODIUM 10 MG/1
TABLET ORAL
COMMUNITY
Start: 2020-05-25 | End: 2020-11-05 | Stop reason: SDUPTHER

## 2020-06-18 RX ORDER — SPIRONOLACT/HYDROCHLOROTHIAZID 25 MG-25MG
TABLET ORAL
COMMUNITY
End: 2021-04-27

## 2020-06-18 RX ORDER — ESCITALOPRAM OXALATE 5 MG/1
TABLET ORAL
COMMUNITY
Start: 2020-05-26 | End: 2021-10-27 | Stop reason: SDUPTHER

## 2020-06-18 RX ORDER — ACETAMINOPHEN, DIPHENHYDRAMINE HCL, PHENYLEPHRINE HCL 325; 25; 5 MG/1; MG/1; MG/1
TABLET ORAL
COMMUNITY
End: 2021-04-27

## 2020-06-18 NOTE — PROGRESS NOTES
Isadora Vasquez is a 61 y.o. female who was seen by synchronous (real-time) audio-video technology on 6/18/2020. Consent: Isadora Vasquez, who was seen by synchronous (real-time) audio-video technology, and/or her healthcare decision maker, is aware that this patient-initiated, Telehealth encounter on 6/18/2020 is a billable service, with coverage as determined by her insurance carrier. She is aware that she may receive a bill and has provided verbal consent to proceed: Yes. Assessment & Plan:     Diagnoses and all orders for this visit:    1. Essential hypertension  Asymptomatic. Continue amlodipine. -     Refill amLODIPine (NORVASC) 10 mg tablet; TAKE ONE TABLET BY MOUTH ONCE DAILY FOR HYPERTENSION  -     METABOLIC PANEL, COMPREHENSIVE  -     CBC WITH AUTOMATED DIFF    2. Acquired hypothyroidism  Asymptomatic. Continue present dose of levothyroxine pending lab results.  -     TSH AND FREE T4    3. Moderate persistent asthma without complication  Controlled on Symbicort for maintenance for ProAir HFA for rescue. 4. Atopic dermatitis, unspecified type  Continue mometasone as needed for eczema and triamcinolone cream for seborrheic dermatitis. 5. Seasonal allergic rhinitis due to pollen  Controlled. 6. Screening, lipid  -     LIPID PANEL    7. Screening for diabetes mellitus  -     HEMOGLOBIN A1C WITH EAG    8. Vitamin D deficiency  -     VITAMIN D, 25 HYDROXY    9. Hypomagnesemia  -     MAGNESIUM    10. Screening for cervical cancer  -     REFERRAL TO GYNECOLOGY      Follow-up and Dispositions    · Return in about 2 months (around 8/18/2020), or if symptoms worsen or fail to improve, for HTN, hypothyroidism (in person). Subjective:   Isadora Vasquez is a 61 y.o. female who was seen for Establish Care    Presents to establish care. Her previous PCP was Dr. Fly López and prior to that Dr. Carlos Allison.   She has HTN, hypothyroidism, asthma, allergic rhinitis, atopic dermatitis, and generalized anxiety disorder. Today she has no complaints. Reports she was started on sertraline in March due to anxiety that started regarding COVID-19, but had anxiety disorder prior to this. Takes magnesium for muscle leg cramps. Uses triamcinolone cream on face and buttocks. Uses mometasone for eczema on body. Cardiovascular Review  The patient has hypertension. She reports taking medications as instructed, no medication side effects noted. Diet and Lifestyle: generally follows a low fat low cholesterol diet, generally follows a low sodium diet, sedentary. Lab review: orders written for new lab studies as appropriate; see orders. Thyroid Review  Patient is seen for follow up of hypothyroidism. Thyroid ROS: denies fatigue, weight changes, heat/cold intolerance, bowel/skin changes or CVS symptoms. Taking medication as prescribed. Last TSH unknown; lab order placed. Other Providers: Dr. Glenys Sanchez    occ swelling in legs , lower back     Soc Hx  Single, lives alone in an apartment. Has 1 son and 1 grandchild. 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 Never smoker. Denies alcohol or recreational drug use. No regular exercise. Used to be active at work. Drinks coffee occasionally. Was referred to Dr. Ranjith Scott by Kylah Mccloud, her co-worker.     Health Maintenance  Flu vaccine: 2/2020  Pneumonia vaccine: PPSV-23 9/24/14     Tetanus vaccine: 2015     Zoster vaccine: due for this, instructed her to check at her pharmacy  Colonoscopy: thinks she had it 3 yrs ago, cannot remember name of doctor  Pap smear: 11/25/14, due for this  Mammogram: 9/18/19, negative  Eye exam: Jan 2020, Dr. Glenys Sanchez  Lipids: due for this  A1c: due for this  Advanced Directives: does not have  End of Life: does not have       ROS  Positive for anxiety, occ swelling in legs, occ low back pain  Constitutional: negative for fevers, chills, night sweats, anorexia and weight loss  Eyes:   negative for visual disturbance and irritation  ENT:   negative for tinnitus, sore throat, nasal congestion, ear pains, hoarseness  Respiratory:  negative for cough, hemoptysis, dyspnea,wheezing  CV:   negative for chest pain, palpitations, lower extremity edema  GI:   negative for nausea, vomiting, diarrhea, constipation, abd pain, melena  Endo:               negative for polyuria, polydipsia, polyphagia, cold/heat intolerance  Genitourinary: negative for frequency, dysuria and hematuria  Integument:  negative for rash and pruritus  Hematologic:  negative for easy bruising and gum/nose bleeding  Musculoskel: negative for myalgias, arthralgias, back pain, muscle weakness, joint pain  Neurological:  negative for headaches, dizziness, vertigo, memory problems and gait   Behavl/Psych: negative for feelings of depression, mood change      Prior to Admission medications    Medication Sig Start Date End Date Taking? Authorizing Provider   Symbicort 160-4.5 mcg/actuation HFAA INHALE 2 PUFFS BY MOUTH TWICE DAILY 6/3/20  Yes Provider, Historical   escitalopram oxalate (LEXAPRO) 5 mg tablet TAKE 1 TABLET BY MOUTH ONCE DAILY FOR 90 DAYS 5/26/20  Yes Provider, Historical   montelukast (SINGULAIR) 10 mg tablet  5/25/20  Yes Provider, Historical   levothyroxine (SYNTHROID) 75 mcg tablet Take 75 mcg by mouth Daily (before breakfast). Yes Provider, Historical   cyanocobalamin, vitamin B-12, (Vitamin B-12) 5,000 mcg subl by SubLINGual route. Yes Provider, Historical   magnesium oxide (MAG-OX) 400 mg tablet Take 400 mg by mouth daily. Yes Provider, Historical   coenzyme q10 (Co Q-10) 300 mg cap Take  by mouth. Yes Provider, Historical   COD LIVER OIL, BULK, by Does Not Apply route. Yes Provider, Historical   ZINC ACETATE PO Take  by mouth.    Yes Provider, Historical   triamcinolone acetonide (KENALOG) 0.025 % topical cream APPLY A THIN LAYER OF CREAM TOPICALLY TO AFFECTED AREA TWICE DAILY 5/10/18  Yes Sherlie Saint, Tad Odom MD   amLODIPine (NORVASC) 10 mg tablet TAKE ONE TABLET BY MOUTH ONCE DAILY FOR HYPERTENSION 4/28/18  Yes Jennifer Grace MD   fluocinoNIDE (LIDEX) 0.05 % topical cream Apply  to affected area two (2) times a day. 6/1/17  Yes Inge Johnson NP   albuterol (VENTOLIN HFA) 90 mcg/actuation inhaler Take 1 Puff by inhalation every six (6) hours as needed for Wheezing. 3/30/17  Yes Jennifer Grace MD   mometasone (ELOCON) 0.1 % topical cream Apply as directed. 3/30/17  Yes Jennifer Grace MD   albuterol (PROVENTIL VENTOLIN) 2.5 mg /3 mL (0.083 %) nebulizer solution 3 mL by Nebulization route every four (4) hours as needed for Wheezing. 3/30/17  Yes Jennifer Grace MD   inhalational spacing device (POCKET CHAMBER) Use as directed with albuterol inhaler prn 6/30/16  Yes Lars Rothman MD   triamcinolone (NASACORT AQ) 55 mcg nasal inhaler 2 Sprays daily. Yes Other, MD Geno   vit B Cmplx 3-FA-Vit C-Biotin (NEPHRO JEFFERY RX) 1- mg-mg-mcg tablet Take 1 Tab by mouth daily. Yes Other, MD Geno   MULTIVITAMIN (MULTIPLE VITAMIN PO) Take 1 Tab by mouth daily (with breakfast). Yes Provider, Historical     Allergies   Allergen Reactions    Latex Rash    Seafood [Shellfish Containing Products] Angioedema     THROAT SWELLS    Tree Nut Anaphylaxis     Throat swelling    Lisinopril Not Reported This Time     LIPS SWELL    Bactrim [Sulfamethoprim Ds] Other (comments)     BLISTERS ON HANDS AND FEET    Banana Other (comments) and Angioedema     mouth became red and itchy.     Iodine Other (comments)     Created bumps, soreness-betadine    Lisinopril Swelling     Angioedema on 11/25/2010    Povidone Rash    Sulfa (Sulfonamide Antibiotics) Rash and Itching       Patient Active Problem List   Diagnosis Code    Intraductal papilloma D36.9    Hypothyroidism E03.9    Hypertension I10    Allergic rhinitis J30.9    Asthma J45.909    Seborrhea L21.9    Atopic dermatitis L20.9    Incidental pulmonary nodule, less than or equal to 3mm R91.1       Objective:   Vital Signs: (As obtained by patient/caregiver at home)  Visit Vitals  LMP 10/20/2014        [INSTRUCTIONS:  \"[x]\" Indicates a positive item  \"[]\" Indicates a negative item  -- DELETE ALL ITEMS NOT EXAMINED]    Constitutional: [x] Appears well-developed and well-nourished [x] No apparent distress      [] Abnormal -     Mental status: [x] Alert and awake  [x] Oriented to person/place/time [x] Able to follow commands    [] Abnormal -     Eyes:   EOM    [x]  Normal    [] Abnormal -   Sclera  [x]  Normal    [] Abnormal -          Discharge [x]  None visible   [] Abnormal -     HENT: [x] Normocephalic, atraumatic  [] Abnormal -   [x] Mouth/Throat: Mucous membranes are moist    External Ears [x] Normal  [] Abnormal -    Neck: [x] No visualized mass [] Abnormal -     Pulmonary/Chest: [x] Respiratory effort normal   [x] No visualized signs of difficulty breathing or respiratory distress        [] Abnormal -      Musculoskeletal:   [x] Normal gait with no signs of ataxia         [x] Normal range of motion of neck        [] Abnormal -     Neurological:        [x] No Facial Asymmetry (Cranial nerve 7 motor function) (limited exam due to video visit)          [x] No gaze palsy        [] Abnormal -          Skin:        [x] No significant exanthematous lesions or discoloration noted on facial skin         [] Abnormal -            Psychiatric:       [x] Normal Affect [] Abnormal -        [x] No Hallucinations    Other pertinent observable physical exam findings:- none        We discussed the expected course, resolution and complications of the diagnosis(es) in detail. Medication risks, benefits, costs, interactions, and alternatives were discussed as indicated. I advised her to contact the office if her condition worsens, changes or fails to improve as anticipated. She expressed understanding with the diagnosis(es) and plan.        Nathan Bhatia is a 61 y.o. female who was evaluated by a video visit encounter for concerns as above. Patient identification was verified prior to start of the visit. A caregiver was present when appropriate. Due to this being a TeleHealth encounter ([facility]), evaluation of the following organ systems was limited: Vitals/Constitutional/EENT/Resp/CV/GI//MS/Neuro/Skin/Heme-Lymph-Imm. Pursuant to the emergency declaration under the [address] [place] [address], [facility] waiver authority and the [employer] Act, this Virtual  Visit was conducted, with patient's (and/or legal guardian's) consent, to reduce the patient's risk of exposure to COVID-19 and provide necessary medical care. THIS VISIT WAS COMPLETED VIRTUALLY USING [employer]    Services were provided through a video synchronous discussion virtually to substitute for in-person clinic visit. Patient and provider were located at their individual homes.       Casey Mathis MD

## 2020-06-18 NOTE — PROGRESS NOTES
Gabriel Sun  Identified pt with two pt identifiers(name and ). Chief Complaint   Patient presents with   [facility].     No pain today  1. Have you been to the ER, urgent care clinic since your last visit? Hospitalized since your last visit? NO    2. Have you seen or consulted any other health care providers outside of the [address] [place] [address] since your last visit? Include any pap smears or colon screening. NO    Today's provider has been notified of reason for visit, vitals and flowsheets obtained on patients. Reviewed record In preparation for visit, huddled with provider and have obtained necessary documentation      Health Maintenance Due   Topic    Shingrix Vaccine Age 50> (1 of 2)    FOBT Q1Y Age 54-65     PAP AKA CERVICAL CYTOLOGY     Lipid Screen        Wt Readings from Last 3 Encounters:   17 220 lb (99.8 kg)   17 217 lb (98.4 kg)   17 211 lb (95.7 kg)     Temp Readings from Last 3 Encounters:   17 97.9 °F (36.6 °C) (Oral)   17 98.2 °F (36.8 °C) (Oral)   17 98.1 °F (36.7 °C) (Oral)     BP Readings from Last 3 Encounters:   17 129/75   17 143/79   17 132/79     Pulse Readings from Last 3 Encounters:   17 87   17 92   17 74     There were no vitals filed for this visit. Learning Assessment:  :     Learning Assessment 7/10/2014   PRIMARY LEARNER Patient   HIGHEST LEVEL OF EDUCATION - PRIMARY LEARNER  GRADUATED HIGH SCHOOL OR GED   BARRIERS PRIMARY LEARNER NONE   PRIMARY LANGUAGE ENGLISH   LEARNER PREFERENCE PRIMARY READING   ANSWERED BY patient       Depression Screening:  :     3 most recent PHQ Screens 2020   Little interest or pleasure in doing things Not at all   Feeling down, depressed, irritable, or hopeless Not at all   Total Score PHQ 2 0       Fall Risk Assessment:  :     Fall Risk Assessment, last 12 mths 2020   Able to walk? Yes   Fall in past 12 months?  No       Abuse Screening:  : No flowsheet data found. ADL Screening:  :     ADL Assessment 6/18/2020   Feeding yourself No Help Needed   Getting from bed to chair No Help Needed   Getting dressed No Help Needed   Bathing or showering No Help Needed   Walk across the room (includes cane/walker) No Help Needed   Using the telphone No Help Needed   Taking your medications No Help Needed   Preparing meals No Help Needed   Managing money (expenses/bills) No Help Needed   Moderately strenuous housework (laundry) No Help Needed   Shopping for personal items (toiletries/medicines) No Help Needed   Shopping for groceries No Help Needed   Driving No Help Needed   Climbing a flight of stairs No Help Needed   Getting to places beyond walking distances No Help Needed                 Medication reconciliation up to date and corrected with patient at this time.

## 2020-06-30 LAB
25(OH)D3+25(OH)D2 SERPL-MCNC: 22.3 NG/ML (ref 30–100)
ALBUMIN SERPL-MCNC: 4.3 G/DL (ref 3.8–4.9)
ALBUMIN/GLOB SERPL: 1.7 {RATIO} (ref 1.2–2.2)
ALP SERPL-CCNC: 110 IU/L (ref 39–117)
ALT SERPL-CCNC: 9 IU/L (ref 0–32)
AST SERPL-CCNC: 18 IU/L (ref 0–40)
BASOPHILS # BLD AUTO: 0 X10E3/UL (ref 0–0.2)
BASOPHILS NFR BLD AUTO: 1 %
BILIRUB SERPL-MCNC: 0.4 MG/DL (ref 0–1.2)
BUN SERPL-MCNC: 8 MG/DL (ref 8–27)
BUN/CREAT SERPL: 11 (ref 12–28)
CALCIUM SERPL-MCNC: 9.4 MG/DL (ref 8.7–10.3)
CHLORIDE SERPL-SCNC: 103 MMOL/L (ref 96–106)
CHOLEST SERPL-MCNC: 175 MG/DL (ref 100–199)
CO2 SERPL-SCNC: 26 MMOL/L (ref 20–29)
CREAT SERPL-MCNC: 0.73 MG/DL (ref 0.57–1)
EOSINOPHIL # BLD AUTO: 0.3 X10E3/UL (ref 0–0.4)
EOSINOPHIL NFR BLD AUTO: 4 %
ERYTHROCYTE [DISTWIDTH] IN BLOOD BY AUTOMATED COUNT: 16.9 % (ref 11.7–15.4)
EST. AVERAGE GLUCOSE BLD GHB EST-MCNC: 134 MG/DL
GLOBULIN SER CALC-MCNC: 2.5 G/DL (ref 1.5–4.5)
GLUCOSE SERPL-MCNC: 96 MG/DL (ref 65–99)
HBA1C MFR BLD: 6.3 % (ref 4.8–5.6)
HCT VFR BLD AUTO: 40.9 % (ref 34–46.6)
HDLC SERPL-MCNC: 57 MG/DL
HGB BLD-MCNC: 12.8 G/DL (ref 11.1–15.9)
IMM GRANULOCYTES # BLD AUTO: 0 X10E3/UL (ref 0–0.1)
IMM GRANULOCYTES NFR BLD AUTO: 0 %
LDLC SERPL CALC-MCNC: 95 MG/DL (ref 0–99)
LYMPHOCYTES # BLD AUTO: 2.4 X10E3/UL (ref 0.7–3.1)
LYMPHOCYTES NFR BLD AUTO: 33 %
MAGNESIUM SERPL-MCNC: 2.2 MG/DL (ref 1.6–2.3)
MCH RBC QN AUTO: 22.3 PG (ref 26.6–33)
MCHC RBC AUTO-ENTMCNC: 31.3 G/DL (ref 31.5–35.7)
MCV RBC AUTO: 71 FL (ref 79–97)
MONOCYTES # BLD AUTO: 0.6 X10E3/UL (ref 0.1–0.9)
MONOCYTES NFR BLD AUTO: 8 %
NEUTROPHILS # BLD AUTO: 4.1 X10E3/UL (ref 1.4–7)
NEUTROPHILS NFR BLD AUTO: 54 %
PLATELET # BLD AUTO: 416 X10E3/UL (ref 150–450)
POTASSIUM SERPL-SCNC: 4 MMOL/L (ref 3.5–5.2)
PROT SERPL-MCNC: 6.8 G/DL (ref 6–8.5)
RBC # BLD AUTO: 5.73 X10E6/UL (ref 3.77–5.28)
SODIUM SERPL-SCNC: 145 MMOL/L (ref 134–144)
T4 FREE SERPL-MCNC: 1.73 NG/DL (ref 0.82–1.77)
TRIGL SERPL-MCNC: 116 MG/DL (ref 0–149)
TSH SERPL DL<=0.005 MIU/L-ACNC: 0.03 UIU/ML (ref 0.45–4.5)
VLDLC SERPL CALC-MCNC: 23 MG/DL (ref 5–40)
WBC # BLD AUTO: 7.4 X10E3/UL (ref 3.4–10.8)

## 2020-07-02 ENCOUNTER — TELEPHONE (OUTPATIENT)
Dept: INTERNAL MEDICINE CLINIC | Facility: CLINIC | Age: 60
End: 2020-07-02

## 2020-07-02 DIAGNOSIS — E03.9 ACQUIRED HYPOTHYROIDISM: Primary | ICD-10-CM

## 2020-07-02 PROBLEM — R73.03 PREDIABETES: Status: ACTIVE | Noted: 2020-07-02

## 2020-07-02 PROBLEM — E55.9 VITAMIN D DEFICIENCY: Status: ACTIVE | Noted: 2020-07-02

## 2020-07-02 RX ORDER — LEVOTHYROXINE SODIUM 50 UG/1
50 TABLET ORAL
Qty: 90 TAB | Refills: 1 | Status: SHIPPED | OUTPATIENT
Start: 2020-07-02 | End: 2020-12-04

## 2020-07-02 NOTE — TELEPHONE ENCOUNTER
Verified patients name and date of birth. Patient wants to be mailed information of diabetic(knows she is prediabetic) diet guidelines. Will print information for mailing.

## 2020-07-02 NOTE — PROGRESS NOTES
Your labs showed normal kidney and liver tests, magnesium, blood counts, and cholesterol. Your sodium level was a little high, likely due to mild dehydration so try to drink more water. Your thyroid level was too high, so Dr. Santiago Hart is decreasing your levothyroxine dose from 75 mcg daily to 50 mcg daily. A new prescription will be sent to your pharmacy. Your diabetes screening test showed prediabetes that is close to becoming diabetes. To decrease your risk of getting full-blown diabetes, work on diet, exercise, and weight loss if your are overweight. For diet, increase fruits, vegetables, and low-fat dairy products, and decrease sweets, soft drinks, juices, and foods high in carbohydrates like white bread, white rice, pasta, potatoes, crackers, potato chips, and fries. Lastly, your vitamin D level was low. Dr. Santiago Hart recommends you take OTC vitamin D 2000 units once daily.

## 2020-07-02 NOTE — TELEPHONE ENCOUNTER
----- Message from [employer] sent at 7/2/2020  9:39 AM EDT -----  Regarding: Dr. Power Overall  Caller: pt    Reason: The patient would like to know a list of things she should stop eating for her diabetes.      Best contact number(s): [phone]

## 2020-07-15 ENCOUNTER — TELEPHONE (OUTPATIENT)
Dept: INTERNAL MEDICINE CLINIC | Facility: CLINIC | Age: 60
End: 2020-07-15

## 2020-07-15 NOTE — TELEPHONE ENCOUNTER
Spoke with pharmacist. They have been dispensing same type of albuterol inhaler and advised that something has changed with pateints insurance. Advised patient to call their insurance company. Patient stated understanding.

## 2020-07-15 NOTE — TELEPHONE ENCOUNTER
Verified patients name and date of birth. Patient stated she has been paying $0 for her albuterol inhaler and now it is $45. Will call pharmacy to determine reason.

## 2020-07-15 NOTE — TELEPHONE ENCOUNTER
----- Message from [employer] sent at 7/14/2020  4:11 PM EDT -----  Regarding: Dr. Nasreen Gomez  Patient states that the Pro Air Medication was too Expensive and the Albuterol medication was cheaper, she had a script for the Hydro-Run but she did not get it due to cost at [facility],  pls contact pt at [phone].

## 2020-08-06 DIAGNOSIS — L21.9 SEBORRHEA: ICD-10-CM

## 2020-08-06 DIAGNOSIS — J45.40 MODERATE PERSISTENT ASTHMA WITHOUT COMPLICATION: Primary | ICD-10-CM

## 2020-08-06 RX ORDER — TRIAMCINOLONE ACETONIDE 0.25 MG/G
CREAM TOPICAL
Qty: 45 G | Refills: 0 | Status: SHIPPED | OUTPATIENT
Start: 2020-08-06 | End: 2020-12-04

## 2020-08-06 RX ORDER — ALBUTEROL SULFATE 90 UG/1
1 AEROSOL, METERED RESPIRATORY (INHALATION)
Qty: 1 INHALER | Refills: 3 | Status: SHIPPED | OUTPATIENT
Start: 2020-08-06 | End: 2020-10-01 | Stop reason: SDUPTHER

## 2020-08-06 RX ORDER — MOMETASONE FUROATE 1 MG/G
CREAM TOPICAL
Qty: 60 G | Refills: 1 | Status: SHIPPED | OUTPATIENT
Start: 2020-08-06 | End: 2021-04-27 | Stop reason: ALTCHOICE

## 2020-08-06 NOTE — TELEPHONE ENCOUNTER
Requested Prescriptions     Pending Prescriptions Disp Refills    triamcinolone acetonide (KENALOG) 0.025 % topical cream 15 g 0     Sig: use thin layer

## 2020-08-06 NOTE — TELEPHONE ENCOUNTER
REFILL     PCP: Silvia Peralta MD     Last appt: Visit date not found   Future Appointments   Date Time Provider Luz Elena Jansen   8/18/2020 11:10 AM Silvia Peralta MD [facility]        Requested Prescriptions     Pending Prescriptions Disp Refills    triamcinolone acetonide (KENALOG) 0.025 % topical cream 15 g 0     Sig: use thin layer      Patient uses trimacinolone as needed for for seborrheic dermatitis

## 2020-08-18 ENCOUNTER — OFFICE VISIT (OUTPATIENT)
Dept: INTERNAL MEDICINE CLINIC | Age: 60
End: 2020-08-18
Payer: COMMERCIAL

## 2020-08-18 VITALS
OXYGEN SATURATION: 91 % | RESPIRATION RATE: 14 BRPM | HEART RATE: 78 BPM | HEIGHT: 64 IN | WEIGHT: 203 LBS | DIASTOLIC BLOOD PRESSURE: 83 MMHG | BODY MASS INDEX: 34.66 KG/M2 | SYSTOLIC BLOOD PRESSURE: 136 MMHG | TEMPERATURE: 98.1 F

## 2020-08-18 DIAGNOSIS — E55.9 VITAMIN D DEFICIENCY: ICD-10-CM

## 2020-08-18 DIAGNOSIS — E66.01 SEVERE OBESITY (HCC): ICD-10-CM

## 2020-08-18 DIAGNOSIS — Z12.39 SCREENING FOR BREAST CANCER: ICD-10-CM

## 2020-08-18 DIAGNOSIS — I10 ESSENTIAL HYPERTENSION: Primary | ICD-10-CM

## 2020-08-18 DIAGNOSIS — R73.03 PREDIABETES: ICD-10-CM

## 2020-08-18 DIAGNOSIS — Z12.11 SCREENING FOR COLON CANCER: ICD-10-CM

## 2020-08-18 DIAGNOSIS — E03.9 ACQUIRED HYPOTHYROIDISM: ICD-10-CM

## 2020-08-18 DIAGNOSIS — Z12.4 SCREENING FOR CERVICAL CANCER: ICD-10-CM

## 2020-08-18 PROCEDURE — 99214 OFFICE O/P EST MOD 30 MIN: CPT | Performed by: INTERNAL MEDICINE

## 2020-08-18 RX ORDER — GLUCOSAMINE SULFATE 1500 MG
POWDER IN PACKET (EA) ORAL DAILY
COMMUNITY

## 2020-08-18 NOTE — PATIENT INSTRUCTIONS
Prediabetes: Care Instructions Overview Prediabetes is a warning sign that you're at risk for getting type 2 diabetes. It means that your blood sugar is higher than it should be. But it's not high enough to be diabetes. The food you eat naturally turns into sugar. Your body uses the sugar for energy. Normally, an organ called the pancreas makes insulin. And insulin allows the sugar in your blood to get into your body's cells. But sometimes the body can't use insulin the right way. So the sugar stays in your blood instead. This is called insulin resistance. The buildup of sugar in your blood means you have prediabetes. The good news is that you may be able to prevent or delay diabetes. Making small lifestyle changes, like getting active and changing your eating habits, may help you get your blood sugar back to normal. You can work with your doctor to make a treatment plan. Follow-up care is a key part of your treatment and safety. Be sure to make and go to all appointments, and call your doctor if you are having problems. It's also a good idea to know your test results and keep a list of the medicines you take. How can you care for yourself at home? · Watch your weight. A healthy weight helps your body use insulin properly. · Limit the amount of calories, sweets, and unhealthy fat you eat. Ask your doctor if you should see a dietitian. A registered dietitian can help you create meal plans that fit your lifestyle. · Get at least 30 minutes of exercise on most days of the week. Exercise helps control your blood sugar. It also helps you maintain a healthy weight. Walking is a good choice. You also may want to do other activities, such as running, swimming, cycling, or playing tennis or team sports. · Do not smoke. Smoking can make prediabetes worse. If you need help quitting, talk to your doctor about stop-smoking programs and medicines. These can increase your chances of quitting for good. · If your doctor prescribed medicines, take them exactly as prescribed. Call your doctor if you think you are having a problem with your medicine. You will get more details on the specific medicines your doctor prescribes. When should you call for help? Watch closely for changes in your health, and be sure to contact your doctor if: 
· You have any symptoms of diabetes. These may include: 
? Being thirsty more often. ? Urinating more. ? Being hungrier. ? Losing weight. ? Being very tired. ? Having blurry vision. · You have a wound that will not heal. 
· You have an infection that will not go away. · You have problems with your blood pressure. · You want more information about diabetes and how you can keep from getting it. Where can you learn more? Go to http://www.gray.com/ Enter I222 in the search box to learn more about \"Prediabetes: Care Instructions. \" Current as of: December 20, 2019               Content Version: 12.5 © 0013-4043 [employer]. Care instructions adapted under license by [employer] (which disclaims liability or warranty for this information). If you have questions about a medical condition or this instruction, always ask your healthcare professional. [address] any warranty or liability for your use of this information. Learning About Vitamin D Why is it important to get enough vitamin D? Your body needs vitamin D to absorb calcium. Calcium keeps your bones and muscles, including your heart, healthy and strong. If your muscles don't get enough calcium, they can cramp, hurt, or feel weak. You may have long-term (chronic) muscle aches and pains. If you don't get enough vitamin D throughout life, you have an increased chance of having thin and brittle bones (osteoporosis) in your later years.  Children who don't get enough vitamin D may not grow as much as others their age. They also have a chance of getting a rare disease called rickets. It causes weak bones. Vitamin D and calcium are added to many foods. And your body uses sunshine to make its own vitamin D. How much vitamin D do you need? The [place] of [employer] recommends that people ages 3 through 79 get 600 IU (international units) every day. Adults 71 and older need 800 IU every day. Blood tests for vitamin D can check your vitamin D level. But there is no standard normal range used by all laboratories. You're likely getting enough vitamin D if your levels are in the range of 20 to 50 ng/mL. How can you get more vitamin D? Foods that contain vitamin D include: 
· [place], tuna, and mackerel. These are some of the best foods to eat when you need to get more vitamin D. 
· Cheese, egg yolks, and beef liver. These foods have vitamin D in small amounts. · Milk, soy drinks, orange juice, yogurt, margarine, and some kinds of cereal have vitamin D added to them. Some people don't make vitamin D as well as others. They may have to take extra care in getting enough vitamin D. Things that reduce how much vitamin D your body makes include: · Dark skin, such as many  Americans have. · Age, especially if you are older than 72. · Digestive problems, such as Crohn's or celiac disease. · Liver and kidney disease. Some people who do not get enough vitamin D may need supplements. Are there any risks from taking vitamin D? 
· Too much vitamin D: 
? Can damage your kidneys. ? Can cause nausea and vomiting, constipation, and weakness. ? Raises the amount of calcium in your blood. If this happens, you can get confused or have an irregular heart rhythm. · Vitamin D may interact with other medicines. Tell your doctor about all of the medicines you take, including over-the-counter drugs, herbs, and pills. Tell your doctor about all of your current medical problems. Where can you learn more? Go to http://www.gray.com/ Enter [id number] in the search box to learn more about \"Learning About Vitamin D.\" 
Current as of: August 22, 2019               Content Version: 12.5 © 4234-7308 [employer]. Care instructions adapted under license by [employer] (which disclaims liability or warranty for this information). If you have questions about a medical condition or this instruction, always ask your healthcare professional. [address] any warranty or liability for your use of this information.

## 2020-08-18 NOTE — PROGRESS NOTES
Debby Bowman is a 61 y.o. female  Chief Complaint   Patient presents with    Hypertension     Health Maintenance Due   Topic Date Due    Shingrix Vaccine Age 50> (1 of 2) 03/02/2010    FOBT Q1Y Age 50-75  03/02/2010    PAP AKA CERVICAL CYTOLOGY  11/25/2017    Influenza Age 5 to Adult  08/01/2020     Visit Vitals  /83 (BP 1 Location: Left arm, BP Patient Position: Sitting)   Pulse 78   Temp 98.1 °F (36.7 °C) (Oral)   Resp 14   Ht 5' 3.78\" (1.62 m)   Wt 203 lb (92.1 kg)   SpO2 91%   BMI 35.09 kg/m²     1. Have you been to the ER, urgent care clinic since your last visit? Hospitalized since your last visit? No    2. Have you seen or consulted any other health care providers outside of the [address] [place] [address] since your last visit? Include any pap smears or colon screening.  Sarah Beth     .Lisa Will

## 2020-08-18 NOTE — PROGRESS NOTES
CC:   Chief Complaint   Patient presents with    Hypertension       HISTORY OF PRESENT ILLNESS  Dilia Eugene is a 61 y.o. female. Presents for 2 month follow up evaluation. Seen as new patient by virtual visit on 6/18/20. She has HTN, hypothyroidism, asthma, allergic rhinitis, atopic dermatitis, and generalized anxiety disorder. Today she has no complaints. Reports she took only a few Lexapro tablets prescribed by Dr. Radha Sheriff (former PCP) for anxiety at start of COVID-19 pandemic, no longer takes. ON several vitamins. Uses triamcinolone cream on face and buttocks. Uses mometasone for eczema on body.     Cardiovascular Review  The patient has hypertension.  She reports taking medications as instructed, no medication side effects noted.  Diet and Lifestyle: generally follows a low fat low cholesterol diet, generally follows a low sodium diet, sedentary.  Lab review: orders written for new lab studies as appropriate; see orders.       Thyroid Review  Patient is seen for follow up of hypothyroidism. Hands mildly shaky in the mornings. Thyroid ROS: denies fatigue, weight changes, heat/cold intolerance, bowel/skin changes or CVS symptoms. Taking medication as prescribed. Last TSH low at 0.030 with FT4 1.73 on 6/29/20; levothyroxine dose was decreased from 75 mcg daily to 50 mcg daily (she started lower dose on 7/3/20). Soc Hx  Single, lives alone in an apartment. Has 1 son and 1 grandchild. 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 Never smoker. Denies alcohol or recreational drug use. No regular exercise. Used to be active at work. Drinks coffee occasionally. Was referred to Dr. Janette Lesches by Jude Louis, her co-worker.     Health Maintenance  Flu vaccine: 2/2020  Pneumonia vaccine: PPSV-23 9/24/14                                    Tetanus vaccine: 2015                                     Zoster vaccine: due for this, instructed her to check at her pharmacy  Colonoscopy: 3 yrs ago, cannot remember name of doctor, was told it was normal, due for this  Pap smear: 11/25/14, due for this  Mammogram: 9/18/19, negative, due for this  Eye exam: Jan 2020, Dr. Kervin Arambula  Lipids: 6/29/20 (tot chol 175, LDL 95)  A1c: 6/29/20 (6.3%)  Advanced Directives: does not have  End of Life: does not have                                   ROS  Positive for anxiety, occ swelling in legs, occ low back pain  A complete review of systems was performed and is negative except for those mentioned above and in the HPI. Patient Active Problem List   Diagnosis Code    Intraductal papilloma D36.9    Hypothyroidism E03.9    Hypertension I10    Allergic rhinitis J30.9    Asthma J45.909    Seborrhea L21.9    Atopic dermatitis L20.9    Incidental pulmonary nodule, less than or equal to 3mm R91.1    Prediabetes R73.03    Vitamin D deficiency E55.9    Severe obesity ([facility] [address] E66.01     Past Medical History:   Diagnosis Date    Abnormal uterine bleeding     Asthma     Hayfever     History of mammogram july 2013    History of mammography, screening 6/2013    Hypertension     Iron deficiency anemia     Other ill-defined conditions(799.89) 1981    blood transfusion hx--vaginal delivery    Pap smear for cervical cancer screening 2011    normal    Pap smear for cervical cancer screening 2012    Sinus disease     Thyroid disease     HYPOTHYROID    Unspecified adverse effect of anesthesia     HAD TO FORCE MOUTH OPEN WHEN PT SEDATED     Allergies   Allergen Reactions    Latex Rash    Seafood [Shellfish Containing Products] Angioedema     THROAT SWELLS    Tree Nut Anaphylaxis     Throat swelling    Lisinopril Not Reported This Time     LIPS SWELL    Bactrim [Sulfamethoprim Ds] Other (comments)     BLISTERS ON HANDS AND FEET    Banana Other (comments) and Angioedema     mouth became red and itchy.     Iodine Other (comments)     Created bumps, soreness-betadine    Lisinopril Swelling     Angioedema on 11/25/2010    Povidone Rash    Sulfa (Sulfonamide Antibiotics) Rash and Itching       Current Outpatient Medications   Medication Sig Dispense Refill    cholecalciferol (Vitamin D3) 25 mcg (1,000 unit) cap Take  by mouth daily.  triamcinolone acetonide (KENALOG) 0.025 % topical cream APPLY A THIN LAYER OF CREAM TOPICALLY TO AFFECTED AREA TWICE DAILY 45 g 0    mometasone (ELOCON) 0.1 % topical cream Apply twice daily to affected area. 60 g 1    albuterol (Ventolin HFA) 90 mcg/actuation inhaler Take 1 Puff by inhalation every six (6) hours as needed for Wheezing. 1 Inhaler 3    levothyroxine (SYNTHROID) 50 mcg tablet Take 1 Tab by mouth Daily (before breakfast). 90 Tab 1    Symbicort 160-4.5 mcg/actuation HFAA INHALE 2 PUFFS BY MOUTH TWICE DAILY      escitalopram oxalate (LEXAPRO) 5 mg tablet TAKE 1 TABLET BY MOUTH ONCE DAILY FOR 90 DAYS      montelukast (SINGULAIR) 10 mg tablet       cyanocobalamin, vitamin B-12, (Vitamin B-12) 5,000 mcg subl by SubLINGual route.  magnesium oxide (MAG-OX) 400 mg tablet Take 400 mg by mouth daily.  coenzyme q10 (Co Q-10) 300 mg cap Take  by mouth.  COD LIVER OIL, BULK, by Does Not Apply route.  ZINC ACETATE PO Take  by mouth.  amLODIPine (NORVASC) 10 mg tablet TAKE ONE TABLET BY MOUTH ONCE DAILY FOR HYPERTENSION 90 Tab 1    VITAMIN A ACETATE, BULK, Take 2,400 mcg by mouth daily.  Biotin 2,500 mcg cap Take 2,500 mcg by mouth daily.  albuterol (PROVENTIL VENTOLIN) 2.5 mg /3 mL (0.083 %) nebulizer solution 3 mL by Nebulization route every four (4) hours as needed for Wheezing. 24 Each 0    inhalational spacing device (POCKET CHAMBER) Use as directed with albuterol inhaler prn 1 Device 0    MULTIVITAMIN (MULTIPLE VITAMIN PO) Take 1 Tab by mouth daily (with breakfast).  triamcinolone (NASACORT AQ) 55 mcg nasal inhaler 2 Sprays daily.            PHYSICAL EXAM  Visit Vitals  /83 (BP 1 Location: Left arm, BP Patient Position: Sitting)   Pulse 78   Temp 98.1 °F (36.7 °C) (Oral)   Resp 14   Ht 5' 3.78\" (1.62 m)   Wt 203 lb (92.1 kg)   LMP 10/20/2014   SpO2 91%   BMI 35.09 kg/m²       General: Obese, no distress. HEENT:  Head normocephalic/atraumatic, no scleral icterus  Neck: Supple. Mild right-sided thyromegaly. No carotid bruits, JVD, lymphadenopathy. Lungs:  Clear to ausculation bilaterally. Good air movement. Heart:  Regular rate and rhythm, normal S1 and S2, no murmur, gallop, or rub  Abdomen: Soft, non-distended, normal bowel sounds, no tenderness, no guarding, masses, rebound tenderness, or HSM. Extremities: No clubbing, cyanosis, or edema. 2+ pedal pulses bilaterally. Neurological: Alert and oriented. Psychiatric: Normal mood and affect. Behavior is normal.       ASSESSMENT AND PLAN    ICD-10-CM ICD-9-CM    1. Essential hypertension  I10 401.9    2. Acquired hypothyroidism  E03.9 244.9 TSH AND FREE T4   3. Prediabetes  R73.03 790.29 HEMOGLOBIN A1C WITH EAG   4. Vitamin D deficiency  E55.9 268.9    5. Severe obesity ([address]  E66.01 278.01    6. Screening for cervical cancer  Z12.4 V76.2    7. Screening for breast cancer  Z12.39 V76.10 PAULA MAMMO BI SCREENING INCL CAD   8. Screening for colon cancer  Z12.11 V76.51 OCCULT BLOOD IMMUNOASSAY,DIAGNOSTIC     Diagnoses and all orders for this visit:    1. Essential hypertension  Controlled on present management. 2. Acquired hypothyroidism  Levothyroxine dose decreased to 50 mg daily about 6 weeks ago. Check labs in 2 weeks. -     TSH AND FREE T4    3. Prediabetes  -     HEMOGLOBIN A1C WITH EAG    4. Vitamin D deficiency  Continue cholecalciferol 2000 units daily. 5. Severe obesity ([address]  Counseled on diet, exercise, and weight loss. 6. Screening for cervical cancer  She already has referral; instructed her to schedule appointment for Pap smear. 7. Screening for breast cancer  -     PAULA MAMMO BI SCREENING INCL CAD; Future    8.  Screening for colon cancer  -     OCCULT BLOOD IMMUNOASSAY,DIAGNOSTIC; Future      Follow-up and Dispositions    · Return in about 4 months (around 12/18/2020), or if symptoms worsen or fail to improve, for HTN, thyroid, prediabetes. I have discussed the diagnosis with the patient and the intended plan as seen in the above orders. Patient is in agreement. The patient has received an after-visit summary and questions were answered concerning future plans. I have discussed medication side effects and warnings with the patient as well.

## 2020-09-29 LAB — HEMOCCULT STL QL IA: NEGATIVE

## 2020-09-30 ENCOUNTER — TELEPHONE (OUTPATIENT)
Dept: INTERNAL MEDICINE CLINIC | Age: 60
End: 2020-09-30

## 2020-09-30 DIAGNOSIS — J45.40 MODERATE PERSISTENT ASTHMA WITHOUT COMPLICATION: Primary | ICD-10-CM

## 2020-09-30 NOTE — PROGRESS NOTES
Letter sent: Your FIT test for colon cancer screening returned negative. This means you are very unlikely to have colon cancer. You should have a repeat FIT test in one year.

## 2020-10-01 RX ORDER — ALBUTEROL SULFATE 90 UG/1
2 AEROSOL, METERED RESPIRATORY (INHALATION)
Qty: 1 INHALER | Refills: 5 | Status: SHIPPED | OUTPATIENT
Start: 2020-10-01 | End: 2021-12-22

## 2020-10-15 ENCOUNTER — HOSPITAL ENCOUNTER (OUTPATIENT)
Dept: MAMMOGRAPHY | Age: 60
Discharge: HOME | End: 2020-10-15
Attending: INTERNAL MEDICINE
Payer: COMMERCIAL

## 2020-10-15 DIAGNOSIS — Z12.39 SCREENING FOR BREAST CANCER: ICD-10-CM

## 2020-10-15 PROCEDURE — 77067 SCR MAMMO BI INCL CAD: CPT

## 2020-11-05 RX ORDER — MONTELUKAST SODIUM 10 MG/1
10 TABLET ORAL DAILY
Qty: 90 TAB | Refills: 3 | Status: SHIPPED | OUTPATIENT
Start: 2020-11-05 | End: 2021-11-01

## 2020-11-05 NOTE — TELEPHONE ENCOUNTER
PCP: Anderson Howell MD     Last appt: 8/18/2020   No future appointments. Requested Prescriptions     Pending Prescriptions Disp Refills    montelukast (SINGULAIR) 10 mg tablet 90 Tab 3     Sig: Take 1 Tab by mouth daily.

## 2020-11-05 NOTE — TELEPHONE ENCOUNTER
Pt called to request a refill of   Requested Prescriptions     Pending Prescriptions Disp Refills    montelukast (SINGULAIR) 10 mg tablet        Be called into the natue on file.

## 2020-11-24 ENCOUNTER — TELEPHONE (OUTPATIENT)
Dept: INTERNAL MEDICINE CLINIC | Age: 60
End: 2020-11-24

## 2020-11-24 NOTE — TELEPHONE ENCOUNTER
Patient wants a work note to be out until 11/30 and for previous days she has missed from work. States she needs work note for work if she misses more than 3 days. Advised she would have to be seen to be considered for work note. Advised she call for virtual appt.

## 2020-11-24 NOTE — TELEPHONE ENCOUNTER
----- Message from Alina Mtz sent at 11/24/2020  2:05 PM EST -----  Regarding: Dr. Celia Saab first and last name:  Reason for call: Doctors Note  Callback required yes/no and why: Yes  Best contact number(s): [phone]  Details to clarify the request: Pt stated she is doing a little better with her asthma but is needing a doctor's note to stay out of work until Monday 11/30/2020. Please call to discuss.

## 2020-12-03 ENCOUNTER — TELEPHONE (OUTPATIENT)
Dept: INTERNAL MEDICINE CLINIC | Age: 60
End: 2020-12-03

## 2020-12-03 NOTE — TELEPHONE ENCOUNTER
Pt called to let us know she just tested positive for Covid. The clinic that tested her told her to inform her PCP. She just wanted it documented that she did call.

## 2021-02-01 RX ORDER — BUDESONIDE AND FORMOTEROL FUMARATE DIHYDRATE 160; 4.5 UG/1; UG/1
AEROSOL RESPIRATORY (INHALATION)
Qty: 3 INHALER | Refills: 1 | Status: SHIPPED | OUTPATIENT
Start: 2021-02-01 | End: 2021-09-15

## 2021-02-01 NOTE — TELEPHONE ENCOUNTER
[facility] on file sent a fax requesting a refill of symbicort 160-4.5 mcg aerosol be faxed to [phone].

## 2021-02-01 NOTE — TELEPHONE ENCOUNTER
PCP: Juan Francisco Gallardo MD     Last appt: 8/18/2020   No future appointments.      Requested Prescriptions     Pending Prescriptions Disp Refills    Symbicort 160-4.5 mcg/actuation HFAA 3 Inhaler 3     Sig: INHALE 2 PUFFS BY MOUTH TWICE DAILY

## 2021-04-12 DIAGNOSIS — I10 ESSENTIAL HYPERTENSION: ICD-10-CM

## 2021-04-12 NOTE — TELEPHONE ENCOUNTER
Requested Prescriptions     Pending Prescriptions Disp Refills    amLODIPine (NORVASC) 10 mg tablet 90 Tab 1     Sig: TAKE ONE TABLET BY MOUTH ONCE DAILY FOR HYPERTENSION

## 2021-04-14 RX ORDER — AMLODIPINE BESYLATE 10 MG/1
TABLET ORAL
Qty: 90 TAB | Refills: 1 | Status: SHIPPED | OUTPATIENT
Start: 2021-04-14 | End: 2021-10-10

## 2021-04-27 ENCOUNTER — VIRTUAL VISIT (OUTPATIENT)
Dept: INTERNAL MEDICINE CLINIC | Age: 61
End: 2021-04-27
Payer: COMMERCIAL

## 2021-04-27 DIAGNOSIS — L70.0 ACNE VULGARIS: ICD-10-CM

## 2021-04-27 DIAGNOSIS — L20.9 ATOPIC DERMATITIS, UNSPECIFIED TYPE: Primary | ICD-10-CM

## 2021-04-27 PROCEDURE — 99213 OFFICE O/P EST LOW 20 MIN: CPT | Performed by: NURSE PRACTITIONER

## 2021-04-27 RX ORDER — CLINDAMYCIN AND BENZOYL PEROXIDE 10; 50 MG/G; MG/G
GEL TOPICAL 2 TIMES DAILY
Qty: 1 TUBE | Refills: 0 | Status: SHIPPED | OUTPATIENT
Start: 2021-04-27 | End: 2021-10-27 | Stop reason: ALTCHOICE

## 2021-04-27 RX ORDER — DESONIDE 0.5 MG/ML
LOTION TOPICAL 2 TIMES DAILY
Qty: 59 ML | Refills: 0 | Status: SHIPPED | OUTPATIENT
Start: 2021-04-27 | End: 2021-12-29

## 2021-04-27 RX ORDER — METHYLPREDNISOLONE 4 MG/1
TABLET ORAL
Qty: 1 DOSE PACK | Refills: 0 | Status: SHIPPED | OUTPATIENT
Start: 2021-04-27 | End: 2021-10-27 | Stop reason: ALTCHOICE

## 2021-04-27 NOTE — PROGRESS NOTES
Sarah Vizcarra is a 64 y.o. female who was seen by synchronous (real-time) audio-video technology on 4/27/2021 for Acne (breaking out on the face // has allergies, happens with weather changes // )        Assessment & Plan:     Diagnoses and all orders for this visit:    1. Atopic dermatitis, unspecified type  -     methylPREDNISolone (MEDROL DOSEPACK) 4 mg tablet; Take as directed  -     desonide (TRIDESILON) 0.05 % topical lotion; Apply  to affected area two (2) times a day. Do not use for >2 weeks at at time. For eczema. Prednisone for acute flare and if eczema persists may use topical prn but refrain from prolonged use    2. Acne vulgaris  -     clindamycin-benzoyl peroxide (BENZACLIN) 1-5 % topical gel; Apply  to affected area two (2) times a day. Apply to affected area (pustules) after the skin has been cleansed and dried. Suspect from prolonged mask wearing    Encouraged her to make routine fu with Dr. Gerry Skelton for routine blood work/concern over bruising. Follow-up and Dispositions    · Return if symptoms worsen or fail to improve. Subjective:     Pt c/o rash to face that flares up when the weather changes. Pt has hx of eczema, previously following with [facility] per records- has used mometasone on face in the past.     Gets dark circles and itching under her eyes. with small bumps. Started approx 2 weeks ago. Has been using witch hazel  and steroids ointment from previous PCP- doesn't feel like it's helping. States bumps start out red then will peel. Sometimes gets white heads also. Face is itchy. Has used prednisone in the past which has been helpful. Wears mask all day at work. Also states she bruises easily. Prior to Admission medications    Medication Sig Start Date End Date Taking? Authorizing Provider   amLODIPine (NORVASC) 10 mg tablet TAKE ONE TABLET BY MOUTH ONCE DAILY FOR HYPERTENSION.  4/14/21   Pepe Reddy MD   Symbicort 160-4.5 mcg/actuation HFAA INHALE 2 PUFFS BY MOUTH TWICE DAILY 2/1/21   Paco Palafox MD   levothyroxine (SYNTHROID) 50 mcg tablet TAKE 1 TABLET BY MOUTH ONCE DAILY BEFORE BREAKFAST 12/4/20   Joey Mathew MD   triamcinolone acetonide (KENALOG) 0.025 % topical cream APPLY A THIN LAYER OF CREAM TOPICALLY TO AFFECTED AREA TWICE DAILY 12/4/20   Paco Palafox MD   montelukast (SINGULAIR) 10 mg tablet Take 1 Tab by mouth daily. 11/5/20   Paco Palafox MD   albuterol (PROVENTIL HFA, VENTOLIN HFA, PROAIR HFA) 90 mcg/actuation inhaler Take 2 Puffs by inhalation every six (6) hours as needed for Wheezing. 10/1/20   Paco Palafox MD   cholecalciferol (Vitamin D3) 25 mcg (1,000 unit) cap Take  by mouth daily. Provider, Historical   mometasone (ELOCON) 0.1 % topical cream Apply twice daily to affected area. 8/6/20   Paco Palafox MD   escitalopram oxalate (LEXAPRO) 5 mg tablet TAKE 1 TABLET BY MOUTH ONCE DAILY FOR 90 DAYS 5/26/20   Provider, Historical   cyanocobalamin, vitamin B-12, (Vitamin B-12) 5,000 mcg subl by SubLINGual route. Provider, Historical   magnesium oxide (MAG-OX) 400 mg tablet Take 400 mg by mouth daily. Provider, Historical   coenzyme q10 (Co Q-10) 300 mg cap Take  by mouth. Provider, Historical   COD LIVER OIL, BULK, by Does Not Apply route. Provider, Historical   ZINC ACETATE PO Take  by mouth. Provider, Historical   VITAMIN A ACETATE, BULK, Take 2,400 mcg by mouth daily. Provider, Historical   Biotin 2,500 mcg cap Take 2,500 mcg by mouth daily. Provider, Historical   albuterol (PROVENTIL VENTOLIN) 2.5 mg /3 mL (0.083 %) nebulizer solution 3 mL by Nebulization route every four (4) hours as needed for Wheezing. 3/30/17   Evi Paez MD   inhalational spacing device (POCKET CHAMBER) Use as directed with albuterol inhaler prn 6/30/16   Dickson Lan MD   MULTIVITAMIN (MULTIPLE VITAMIN PO) Take 1 Tab by mouth daily (with breakfast).     Provider, Historical         ROS see hpi  This visit was completed virtually using [email or web address] me       Objective:     Patient-Reported Vitals 6/18/2020   Patient-Reported Height 5f3   Patient-Reported LMP no menses        [INSTRUCTIONS:  \"[x]\" Indicates a positive item  \"[]\" Indicates a negative item  -- DELETE ALL ITEMS NOT EXAMINED]    Constitutional: [x] Appears well-developed and well-nourished [x] No apparent distress      [] Abnormal -     Mental status: [x] Alert and awake  [x] Oriented to person/place/time [x] Able to follow commands    [] Abnormal -     Eyes:   EOM    [x]  Normal    [] Abnormal -   Sclera  [x]  Normal    [] Abnormal -          Discharge [x]  None visible   [] Abnormal -     HENT: [x] Normocephalic, atraumatic  [] Abnormal -   [x] Mouth/Throat: Mucous membranes are moist    External Ears [x] Normal  [] Abnormal -    Neck: [x] No visualized mass [] Abnormal -     Pulmonary/Chest: [x] Respiratory effort normal   [x] No visualized signs of difficulty breathing or respiratory distress        [] Abnormal -      Musculoskeletal:   [x] Normal gait with no signs of ataxia         [x] Normal range of motion of neck        [] Abnormal -     Neurological:        [x] No Facial Asymmetry (Cranial nerve 7 motor function) (limited exam due to video visit)          [x] No gaze palsy        [] Abnormal -          Skin:        [] No significant exanthematous lesions or discoloration noted on facial skin         [x] Abnormal -  Multiple erythematous papules and pustules to bilateral cheeks, darkened circles under eyes, exam limited due to video           Psychiatric:       [x] Normal Affect [] Abnormal -        [x] No Hallucinations    Other pertinent observable physical exam findings:-        We discussed the expected course, resolution and complications of the diagnosis(es) in detail. Medication risks, benefits, costs, interactions, and alternatives were discussed as indicated.   I advised her to contact the office if her condition worsens, changes or fails to improve as anticipated. She expressed understanding with the diagnosis(es) and plan. [facility], was evaluated through a synchronous (real-time) audio-video encounter. The patient (or guardian if applicable) is aware that this is a billable service. Verbal consent to proceed has been obtained within the past 12 months. The visit was conducted pursuant to the emergency declaration under the [address] [place] [address] authority and the [email or web address] General Act. Patient identification was verified, and a caregiver was present when appropriate. The patient was located in a state where the provider was credentialed to provide care.       Cary Wells NP

## 2021-04-27 NOTE — PROGRESS NOTES
Lito Proctor  Identified pt with two pt identifiers(name and ). Chief Complaint   Patient presents with    Acne     breaking out on the face // has allergies, happens with weather changes //        Reviewed record In preparation for visit and have obtained necessary documentation. 1. Have you been to the ER, urgent care clinic or hospitalized since your last visit? No     2. Have you seen or consulted any other health care providers outside of the [address] [place] [address] since your last visit? Include any pap smears or colon screening. Yes. PAP - within [facility]    Patient does not have an advance directive. Vitals reviewed with provider. Health Maintenance reviewed:     Health Maintenance Due   Topic    COVID-19 Vaccine (1)    PAP AKA CERVICAL CYTOLOGY           Wt Readings from Last 3 Encounters:   20 203 lb (92.1 kg)   17 220 lb (99.8 kg)   17 217 lb (98.4 kg)        Temp Readings from Last 3 Encounters:   20 98.1 °F (36.7 °C) (Oral)   17 97.9 °F (36.6 °C) (Oral)   17 98.2 °F (36.8 °C) (Oral)        BP Readings from Last 3 Encounters:   20 136/83   17 129/75   17 143/79        Pulse Readings from Last 3 Encounters:   20 78   17 87   17 92      There were no vitals filed for this visit. Learning Assessment:   :       Learning Assessment 7/10/2014   PRIMARY LEARNER Patient   HIGHEST LEVEL OF EDUCATION - PRIMARY LEARNER  GRADUATED HIGH SCHOOL OR GED   BARRIERS PRIMARY LEARNER NONE   PRIMARY LANGUAGE ENGLISH   LEARNER PREFERENCE PRIMARY READING   ANSWERED BY patient        Depression Screening:   :       3 most recent PHQ Screens 2021   Little interest or pleasure in doing things Not at all   Feeling down, depressed, irritable, or hopeless Not at all   Total Score PHQ 2 0        Fall Risk Assessment:   :       Fall Risk Assessment, last 12 mths 2020   Able to walk? Yes   Fall in past 12 months?  No Abuse Screening:   :       Abuse Screening Questionnaire 6/18/2020   Do you ever feel afraid of your partner? N   Are you in a relationship with someone who physically or mentally threatens you? N   Is it safe for you to go home?  Y        ADL Screening:   :       ADL Assessment 6/18/2020   Feeding yourself No Help Needed   Getting from bed to chair No Help Needed   Getting dressed No Help Needed   Bathing or showering No Help Needed   Walk across the room (includes cane/walker) No Help Needed   Using the telphone No Help Needed   Taking your medications No Help Needed   Preparing meals No Help Needed   Managing money (expenses/bills) No Help Needed   Moderately strenuous housework (laundry) No Help Needed   Shopping for personal items (toiletries/medicines) No Help Needed   Shopping for groceries No Help Needed   Driving No Help Needed   Climbing a flight of stairs No Help Needed   Getting to places beyond walking distances No Help Needed

## 2021-04-29 ENCOUNTER — TELEPHONE (OUTPATIENT)
Dept: INTERNAL MEDICINE CLINIC | Age: 61
End: 2021-04-29

## 2021-04-29 NOTE — TELEPHONE ENCOUNTER
Patient said she was prescribed medication yesterday and wants to know if she can mix the medications, can both be put on her face. Please give patient a call back.   BCB# [phone]

## 2021-04-29 NOTE — TELEPHONE ENCOUNTER
Verified two patient identifiers, name and . Pt notified of Divine NP message. Pt had no further questions at this time.

## 2021-05-07 ENCOUNTER — TELEPHONE (OUTPATIENT)
Dept: INTERNAL MEDICINE CLINIC | Age: 61
End: 2021-05-07

## 2021-05-07 NOTE — TELEPHONE ENCOUNTER
Pat called and stated that she had a virtual and was giving Predisone. Patient said that this medication has broken out on her face she would like for the nurse to call her. Patient said she  dont want to pay for another visit.

## 2021-05-07 NOTE — TELEPHONE ENCOUNTER
Pt finished prednisone couple days ago, stopped lotion and gel today. She states darkness is gone but her face is red and neck is broken out. Informed to not use anything over weekend and let the office know if it is not cleared up on Monday. Pt said she will let us know, no further questions at this time.

## 2021-09-15 DIAGNOSIS — J45.40 MODERATE PERSISTENT ASTHMA WITHOUT COMPLICATION: Primary | ICD-10-CM

## 2021-09-15 DIAGNOSIS — E03.9 ACQUIRED HYPOTHYROIDISM: ICD-10-CM

## 2021-09-15 RX ORDER — LEVOTHYROXINE SODIUM 50 UG/1
TABLET ORAL
Qty: 90 TABLET | Refills: 0 | Status: SHIPPED | OUTPATIENT
Start: 2021-09-15 | End: 2021-12-02

## 2021-09-15 RX ORDER — BUDESONIDE AND FORMOTEROL FUMARATE DIHYDRATE 160; 4.5 UG/1; UG/1
AEROSOL RESPIRATORY (INHALATION)
Qty: 33 G | Refills: 0 | Status: SHIPPED | OUTPATIENT
Start: 2021-09-15 | End: 2022-04-17

## 2021-10-09 DIAGNOSIS — I10 ESSENTIAL HYPERTENSION: ICD-10-CM

## 2021-10-10 RX ORDER — AMLODIPINE BESYLATE 10 MG/1
TABLET ORAL
Qty: 90 TABLET | Refills: 0 | Status: SHIPPED | OUTPATIENT
Start: 2021-10-10 | End: 2022-01-03

## 2021-10-27 ENCOUNTER — OFFICE VISIT (OUTPATIENT)
Dept: INTERNAL MEDICINE CLINIC | Age: 61
End: 2021-10-27
Payer: COMMERCIAL

## 2021-10-27 VITALS
DIASTOLIC BLOOD PRESSURE: 80 MMHG | HEIGHT: 64 IN | OXYGEN SATURATION: 95 % | HEART RATE: 81 BPM | BODY MASS INDEX: 36.19 KG/M2 | RESPIRATION RATE: 16 BRPM | WEIGHT: 212 LBS | SYSTOLIC BLOOD PRESSURE: 138 MMHG | TEMPERATURE: 98.4 F

## 2021-10-27 DIAGNOSIS — E66.01 SEVERE OBESITY (HCC): ICD-10-CM

## 2021-10-27 DIAGNOSIS — F41.9 ANXIETY: ICD-10-CM

## 2021-10-27 DIAGNOSIS — Z12.11 SCREENING FOR COLON CANCER: ICD-10-CM

## 2021-10-27 DIAGNOSIS — J45.40 MODERATE PERSISTENT ASTHMA WITHOUT COMPLICATION: ICD-10-CM

## 2021-10-27 DIAGNOSIS — R73.03 PREDIABETES: ICD-10-CM

## 2021-10-27 DIAGNOSIS — I10 PRIMARY HYPERTENSION: Primary | ICD-10-CM

## 2021-10-27 DIAGNOSIS — E03.9 ACQUIRED HYPOTHYROIDISM: ICD-10-CM

## 2021-10-27 DIAGNOSIS — E55.9 VITAMIN D DEFICIENCY: ICD-10-CM

## 2021-10-27 DIAGNOSIS — M79.89 LEG SWELLING: ICD-10-CM

## 2021-10-27 LAB — HBA1C MFR BLD HPLC: 5.8 %

## 2021-10-27 PROCEDURE — 99215 OFFICE O/P EST HI 40 MIN: CPT | Performed by: INTERNAL MEDICINE

## 2021-10-27 PROCEDURE — 83036 HEMOGLOBIN GLYCOSYLATED A1C: CPT | Performed by: INTERNAL MEDICINE

## 2021-10-27 RX ORDER — TRIAMCINOLONE ACETONIDE 0.25 MG/G
CREAM TOPICAL
COMMUNITY
Start: 2021-09-16 | End: 2021-12-29 | Stop reason: DRUGHIGH

## 2021-10-27 RX ORDER — ESCITALOPRAM OXALATE 5 MG/1
TABLET ORAL
Qty: 30 TABLET | Refills: 2 | Status: SHIPPED | OUTPATIENT
Start: 2021-10-27 | End: 2022-01-31

## 2021-10-27 RX ORDER — FUROSEMIDE 20 MG/1
20 TABLET ORAL
Qty: 30 TABLET | Refills: 2 | Status: SHIPPED | OUTPATIENT
Start: 2021-10-27

## 2021-10-27 NOTE — PROGRESS NOTES
Identified pt with two pt identifiers. Reviewed record in preparation for visit and have obtained necessary documentation. All patient medications has been reviewed. Chief Complaint   Patient presents with    Pre-diabetes     Additional information about chief complaint:    Visit Vitals  /80 (BP 1 Location: Left upper arm, BP Patient Position: Sitting, BP Cuff Size: Large adult)   Pulse 81   Temp 98.4 °F (36.9 °C) (Oral)   Resp 16   Ht 5' 3.78\" (1.62 m)   Wt 212 lb (96.2 kg)   SpO2 95%   BMI 36.64 kg/m²       Health Maintenance Due   Topic    Cervical cancer screen     A1C test (Diabetic or Prediabetic)     Colorectal Cancer Screening Combo        1. Have you been to the ER, urgent care clinic since your last visit? Hospitalized since your last visit? No     2. Have you seen or consulted any other health care providers outside of the [address] [place] [address] since your last visit? Include any pap smears or colon screening.   No   bdg

## 2021-10-27 NOTE — PROGRESS NOTES
CC:   Chief Complaint   Patient presents with    Pre-diabetes       HISTORY OF PRESENT ILLNESS  Tariq Gomez is a 64 y.o. female. Presents for follow up evaluation. Last seen in clinic by me on 8/18/20. She has HTN, hypothyroidism, asthma, allergic rhinitis, atopic dermatitis, and generalized anxiety disorder.      Patient complains of feeling tired a lot. Also feels a little depressed and anxious some days. Reports her former PCP, Dr. Matthew Herrera, prescribed her Lexapro in April 2020 after COVID pandemic; filled prescription but  never took. Uses triamcinolone cream for eczema on face and buttocks. Uses mometasone for eczema on body. The patient has hypertension. Denies HA's, CP, SOB, dizziness, heart palpitations. Has leg swelling. Home BP monitoring: not done. She reports taking medications as instructed, no medication side effects noted. Diet and Lifestyle: generally follows a low sodium diet, no formal exercise but active during the day. Lab review: orders written for new lab studies as appropriate; see orders. Patient is seen for follow up of hypothyroidism. Thyroid ROS: has fatigue, denies weight changes, heat/cold intolerance, bowel/skin changes or CVS symptoms. Taking medication as prescribed. Last TSH was low (6/29/20: 0.030). Soc Hx  Single, lives alone in an apartment. [place] 1 son and 1 grandchild. 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 [place] smoker.  Denies alcohol or recreational drug use.  No regular exercise.  Used to be active at work.  Drinks coffee occasionally.  Was referred to Dr. Libia Naylor by Arian Lala, her co-worker. ROS  A complete review of systems was performed and is negative except for those mentioned in the HPI.     Patient Active Problem List   Diagnosis Code    Intraductal papilloma D36.9    Hypothyroidism E03.9    Hypertension I10    Allergic rhinitis J30.9    Asthma J45.909    Seborrhea L21.9    Atopic dermatitis L20.9    Incidental pulmonary nodule, less than or equal to 3mm R91.1    Prediabetes R73.03    Vitamin D deficiency E55.9    Severe obesity (HCC) E66.01     Past Medical History:   Diagnosis Date    Abnormal uterine bleeding     Asthma     Hayfever     History of mammogram july 2013    History of mammography, screening 6/2013    Hypertension     Iron deficiency anemia     Other ill-defined conditions(799.89) 1981    blood transfusion hx--vaginal delivery    Pap smear for cervical cancer screening 2011    normal    Pap smear for cervical cancer screening 2012    Sinus disease     Thyroid disease     HYPOTHYROID    Unspecified adverse effect of anesthesia     HAD TO FORCE MOUTH OPEN WHEN PT SEDATED     Allergies   Allergen Reactions    Latex Rash    Seafood [Shellfish Containing Products] Angioedema     THROAT SWELLS    Tree Nut Anaphylaxis     Throat swelling    Lisinopril Not Reported This Time     LIPS SWELL    Bactrim [Sulfamethoprim Ds] Other (comments)     BLISTERS ON HANDS AND FEET    Banana Other (comments) and Angioedema     mouth became red and itchy.  Iodine Other (comments)     Created bumps, soreness-betadine    Lisinopril Swelling     Angioedema on 11/25/2010    Povidone Rash    Sulfa (Sulfonamide Antibiotics) Rash and Itching       Current Outpatient Medications   Medication Sig Dispense Refill    amLODIPine (NORVASC) 10 mg tablet TAKE 1 TABLET BY MOUTH ONCE DAILY . APPOINTMENT REQUIRED FOR FUTURE REFILLS 90 Tablet 0    Euthyrox 50 mcg tablet TAKE 1 TABLET BY MOUTH ONCE DAILY BEFORE BREAKFAST 90 Tablet 0    Symbicort 160-4.5 mcg/actuation HFAA INHALE 2 PUFFS BY MOUTH TWICE DAILY. NO REFILLS WITHOUT APPOINTMENT 33 g 0    desonide (TRIDESILON) 0.05 % topical lotion Apply  to affected area two (2) times a day. Do not use for >2 weeks at at time. For eczema. 59 mL 0    montelukast (SINGULAIR) 10 mg tablet Take 1 Tab by mouth daily.  90 Tab 3    albuterol (PROVENTIL HFA, VENTOLIN HFA, PROAIR HFA) 90 mcg/actuation inhaler Take 2 Puffs by inhalation every six (6) hours as needed for Wheezing. 1 Inhaler 5    cholecalciferol (Vitamin D3) 25 mcg (1,000 unit) cap Take  by mouth daily.  escitalopram oxalate (LEXAPRO) 5 mg tablet TAKE 1 TABLET BY MOUTH ONCE DAILY FOR 90 DAYS      albuterol (PROVENTIL VENTOLIN) 2.5 mg /3 mL (0.083 %) nebulizer solution 3 mL by Nebulization route every four (4) hours as needed for Wheezing. 24 Each 0    inhalational spacing device (POCKET CHAMBER) Use as directed with albuterol inhaler prn 1 Device 0    MULTIVITAMIN (MULTIPLE VITAMIN PO) Take 1 Tab by mouth daily (with breakfast).  triamcinolone acetonide (KENALOG) 0.025 % topical cream            PHYSICAL EXAM  Visit Vitals  /80 (BP 1 Location: Left upper arm, BP Patient Position: Sitting, BP Cuff Size: Large adult)   Pulse 81   Temp 98.4 °F (36.9 °C) (Oral)   Resp 16   Ht 5' 3.78\" (1.62 m)   Wt 212 lb (96.2 kg)   LMP 10/20/2014   SpO2 95%   BMI 36.64 kg/m²   9 lb weight increase since last clinic visit 14 months ago    General: Obese, no distress. HEENT:  Head normocephalic/atraumatic, no scleral icterus  Neck: Supple. Mild right-sided thyromegaly. No carotid bruits, JVD, lymphadenopathy. Lungs:  Clear to ausculation bilaterally. Good air movement. Heart:  Regular rate and rhythm, normal S1 and S2, no murmur, gallop, or rub  Abdomen: Soft, non-distended, normal bowel sounds, no tenderness, no guarding, masses, rebound tenderness, or HSM. Extremities: No clubbing, cyanosis. 1+ pitting edema at bilateral LE's. 2+ pedal pulses bilaterally. Neurological: Alert and oriented. Psychiatric: Normal mood and affect. Behavior is normal.     Results for orders placed or performed in visit on 08/18/20   OCCULT BLOOD IMMUNOASSAY,DIAGNOSTIC   Result Value Ref Range    Occult blood fecal, by IA Negative Negative         ASSESSMENT AND PLAN    ICD-10-CM ICD-9-CM    1.  Primary hypertension  T48 400.8 METABOLIC PANEL, COMPREHENSIVE      CBC WITH AUTOMATED DIFF      LIPID PANEL      METABOLIC PANEL, COMPREHENSIVE      CBC WITH AUTOMATED DIFF      LIPID PANEL   2. Prediabetes  R73.03 790.29 AMB POC HEMOGLOBIN A1C   3. Anxiety  F41.9 300.00 escitalopram oxalate (LEXAPRO) 5 mg tablet   4. Leg swelling  M79.89 729.81 furosemide (LASIX) 20 mg tablet   5. Moderate persistent asthma without complication  T28.65 296.68    6. Acquired hypothyroidism  E03.9 244.9 THYROID CASCADE PROFILE      THYROID CASCADE PROFILE   7. Severe obesity ([address]  E66.01 278.01    8. Vitamin D deficiency  E55.9 268.9 VITAMIN D, 25 HYDROXY      VITAMIN D, 25 HYDROXY   9. Screening for colon cancer  Z12.11 V76.51 COLOGUARD TEST (FECAL DNA COLORECTAL CANCER SCREENING)     Diagnoses and all orders for this visit:    1. Primary hypertension  Controlled. Continue amlodipine 10 mg daily.  -     METABOLIC PANEL, COMPREHENSIVE; Future  -     CBC WITH AUTOMATED DIFF; Future  -     LIPID PANEL; Future    2. Prediabetes  -     AMB POC HEMOGLOBIN A1C    3. Anxiety  -     Start escitalopram oxalate (LEXAPRO) 5 mg tablet; TAKE 1 TABLET BY MOUTH ONCE DAILY    4. Leg swelling  -     Start furosemide (LASIX) 20 mg tablet; Take 1 Tablet by mouth daily as needed (leg swelling). 5. Moderate persistent asthma without complication  Controlled. Continue Symbicort and Singulair. 6. Acquired hypothyroidism  Has fatigue, anxiety, and weight gain.  -     THYROID CASCADE PROFILE; Future    7. Severe obesity ([facility] [address]  Counseled on diet, exercise, and weight loss. 8. Vitamin D deficiency  -     VITAMIN D, 25 HYDROXY; Future    9. Screening for colon cancer  -     COLOGUARD TEST (FECAL DNA COLORECTAL CANCER SCREENING)      Time Spent: 45 mins on medical record review, exam, history, discussing problems and plan, counseling, placing orders, and documentation.     Follow-up and Dispositions    · Return in about 2 months (around 12/27/2021), or if symptoms worsen or fail to improve, for Pap smear, anxiety. I have discussed the diagnosis with the patient and the intended plan as seen in the above orders. Patient is in agreement. The patient has received an after-visit summary and questions were answered concerning future plans. I have discussed medication side effects and warnings with the patient as well.

## 2021-11-01 ENCOUNTER — TRANSCRIBE ORDER (OUTPATIENT)
Dept: SCHEDULING | Age: 61
End: 2021-11-01

## 2021-11-01 DIAGNOSIS — Z12.31 OTHER SCREENING MAMMOGRAM: Primary | ICD-10-CM

## 2021-11-01 NOTE — TELEPHONE ENCOUNTER
Requested Prescriptions     Pending Prescriptions Disp Refills    mometasone (ELOCON) 0.1 % topical cream 60 g 1     Sig: Apply twice daily to affected area.

## 2021-11-01 NOTE — TELEPHONE ENCOUNTER
PCP: Lilibeth Waddell MD     Last appt: 10/27/2021   Future Appointments   Date Time Provider Luz Elena Jansen   12/29/2021  1:40 PM Lilibeth Waddell MD [facility] BS AMB        Requested Prescriptions     Pending Prescriptions Disp Refills    mometasone (ELOCON) 0.1 % topical cream 60 g 1     Sig: Apply twice daily to affected area.

## 2021-11-02 RX ORDER — MOMETASONE FUROATE 1 MG/G
CREAM TOPICAL
Qty: 60 G | Refills: 0 | Status: SHIPPED | OUTPATIENT
Start: 2021-11-02

## 2021-11-04 LAB
25(OH)D3+25(OH)D2 SERPL-MCNC: 30.4 NG/ML (ref 30–100)
ALBUMIN SERPL-MCNC: 4.4 G/DL (ref 3.8–4.8)
ALBUMIN/GLOB SERPL: 1.5 {RATIO} (ref 1.2–2.2)
ALP SERPL-CCNC: 123 IU/L (ref 44–121)
ALT SERPL-CCNC: 16 IU/L (ref 0–32)
AST SERPL-CCNC: 19 IU/L (ref 0–40)
BASOPHILS # BLD AUTO: 0.1 X10E3/UL (ref 0–0.2)
BASOPHILS NFR BLD AUTO: 1 %
BILIRUB SERPL-MCNC: 0.3 MG/DL (ref 0–1.2)
BUN SERPL-MCNC: 8 MG/DL (ref 8–27)
BUN/CREAT SERPL: 12 (ref 12–28)
CALCIUM SERPL-MCNC: 9.4 MG/DL (ref 8.7–10.3)
CHLORIDE SERPL-SCNC: 100 MMOL/L (ref 96–106)
CHOLEST SERPL-MCNC: 202 MG/DL (ref 100–199)
CO2 SERPL-SCNC: 26 MMOL/L (ref 20–29)
CREAT SERPL-MCNC: 0.68 MG/DL (ref 0.57–1)
EOSINOPHIL # BLD AUTO: 0.2 X10E3/UL (ref 0–0.4)
EOSINOPHIL NFR BLD AUTO: 3 %
ERYTHROCYTE [DISTWIDTH] IN BLOOD BY AUTOMATED COUNT: 16.4 % (ref 11.7–15.4)
GLOBULIN SER CALC-MCNC: 2.9 G/DL (ref 1.5–4.5)
GLUCOSE SERPL-MCNC: 128 MG/DL (ref 65–99)
HCT VFR BLD AUTO: 41.6 % (ref 34–46.6)
HDLC SERPL-MCNC: 68 MG/DL
HGB BLD-MCNC: 13.3 G/DL (ref 11.1–15.9)
IMM GRANULOCYTES # BLD AUTO: 0 X10E3/UL (ref 0–0.1)
IMM GRANULOCYTES NFR BLD AUTO: 0 %
LDLC SERPL CALC-MCNC: 117 MG/DL (ref 0–99)
LYMPHOCYTES # BLD AUTO: 2.7 X10E3/UL (ref 0.7–3.1)
LYMPHOCYTES NFR BLD AUTO: 38 %
MCH RBC QN AUTO: 22.7 PG (ref 26.6–33)
MCHC RBC AUTO-ENTMCNC: 32 G/DL (ref 31.5–35.7)
MCV RBC AUTO: 71 FL (ref 79–97)
MONOCYTES # BLD AUTO: 0.5 X10E3/UL (ref 0.1–0.9)
MONOCYTES NFR BLD AUTO: 8 %
NEUTROPHILS # BLD AUTO: 3.6 X10E3/UL (ref 1.4–7)
NEUTROPHILS NFR BLD AUTO: 50 %
PLATELET # BLD AUTO: 406 X10E3/UL (ref 150–450)
POTASSIUM SERPL-SCNC: 3.9 MMOL/L (ref 3.5–5.2)
PROT SERPL-MCNC: 7.3 G/DL (ref 6–8.5)
RBC # BLD AUTO: 5.85 X10E6/UL (ref 3.77–5.28)
SODIUM SERPL-SCNC: 140 MMOL/L (ref 134–144)
TRIGL SERPL-MCNC: 98 MG/DL (ref 0–149)
TSH SERPL DL<=0.005 MIU/L-ACNC: 0.62 UIU/ML (ref 0.45–4.5)
VLDLC SERPL CALC-MCNC: 17 MG/DL (ref 5–40)
WBC # BLD AUTO: 7.2 X10E3/UL (ref 3.4–10.8)

## 2021-11-13 PROBLEM — E78.00 HYPERCHOLESTEROLEMIA: Status: ACTIVE | Noted: 2021-11-13

## 2021-11-13 NOTE — PROGRESS NOTES
Letter sent: Your labs showed normal kidney and liver tests, blood counts, thyroid, and vitamin D. Continue taking vitamin D 1000 units once daily. Your LDL, or bad cholesterol, was high at 117 (normal is less than 100). Your 10-year ASCVD risk score, or risk of having a heart attack in the next 10 years, (Padma Hassan, et al., 2013) is 8.1%. We usually start treatment with cholesterol-lowering medication if your risk is greater than 7.5%. However, since your LDL was normal last year, I recommend you first work on low-fat diet, exercise, and weight loss. For low-fat diet, increase fruits, vegetables, and low-fat dairy products (such as low-fat yogurt, 1% or skim milk), and decrease fried foods, fast foods, beef, pork, walsh, sausage, and hot dogs. We should plan to recheck your cholesterol in 6 months.

## 2021-11-16 DIAGNOSIS — E87.6 HYPOKALEMIA: Primary | ICD-10-CM

## 2021-11-17 NOTE — TELEPHONE ENCOUNTER
Tell patient to call us back with the name and dose of the potassium liquid she has been taking and wants called in.

## 2021-11-18 PROBLEM — E87.6 HYPOKALEMIA: Status: ACTIVE | Noted: 2021-11-18

## 2021-11-18 RX ORDER — POTASSIUM CHLORIDE 40 MEQ/15ML
SOLUTION ORAL
Qty: 113 ML | Refills: 1 | Status: SHIPPED | OUTPATIENT
Start: 2021-11-18 | End: 2022-09-16

## 2021-12-13 ENCOUNTER — HOSPITAL ENCOUNTER (OUTPATIENT)
Dept: MAMMOGRAPHY | Age: 61
Discharge: HOME | End: 2021-12-13
Attending: INTERNAL MEDICINE
Payer: COMMERCIAL

## 2021-12-13 DIAGNOSIS — Z12.31 OTHER SCREENING MAMMOGRAM: ICD-10-CM

## 2021-12-13 PROCEDURE — 77067 SCR MAMMO BI INCL CAD: CPT

## 2021-12-29 ENCOUNTER — OFFICE VISIT (OUTPATIENT)
Dept: INTERNAL MEDICINE CLINIC | Age: 61
End: 2021-12-29
Payer: COMMERCIAL

## 2021-12-29 VITALS
RESPIRATION RATE: 16 BRPM | TEMPERATURE: 98.2 F | SYSTOLIC BLOOD PRESSURE: 148 MMHG | DIASTOLIC BLOOD PRESSURE: 82 MMHG | OXYGEN SATURATION: 92 % | WEIGHT: 212.6 LBS | BODY MASS INDEX: 37.67 KG/M2 | HEART RATE: 94 BPM | HEIGHT: 63 IN

## 2021-12-29 DIAGNOSIS — Z01.419 WELL WOMAN EXAM WITH ROUTINE GYNECOLOGICAL EXAM: Primary | ICD-10-CM

## 2021-12-29 DIAGNOSIS — L20.9 ATOPIC DERMATITIS, UNSPECIFIED TYPE: ICD-10-CM

## 2021-12-29 DIAGNOSIS — I10 PRIMARY HYPERTENSION: ICD-10-CM

## 2021-12-29 DIAGNOSIS — F41.9 ANXIETY: ICD-10-CM

## 2021-12-29 PROCEDURE — 99214 OFFICE O/P EST MOD 30 MIN: CPT | Performed by: INTERNAL MEDICINE

## 2021-12-29 RX ORDER — TRIAMCINOLONE ACETONIDE 1 MG/G
CREAM TOPICAL
Qty: 15 G | Refills: 1 | Status: SHIPPED | OUTPATIENT
Start: 2021-12-29 | End: 2022-04-17

## 2021-12-29 NOTE — PATIENT INSTRUCTIONS
Atopic Dermatitis: Care Instructions  Overview  Atopic dermatitis (also called eczema) is a skin problem that causes intense itching and a red, raised rash. In severe cases, the rash develops clear fluid-filled blisters. The rash is not contagious. You can't catch it from others. People with this condition seem to have very sensitive immune systems that are likely to react to things that cause allergies. The immune system is the body's way of fighting infection. There is no cure for atopic dermatitis. But you may be able to control it with care at home. Follow-up care is a key part of your treatment and safety. Be sure to make and go to all appointments, and call your doctor if you are having problems. It's also a good idea to know your test results and keep a list of the medicines you take. How can you care for yourself at home? · Use moisturizer at least twice a day. · If your doctor prescribes a cream, use it as directed. If your doctor prescribes other medicine, take it exactly as directed. · Wash the affected area with warm (not hot) water only. Soap can make dryness and itching worse. Pat dry. · Apply a moisturizer after bathing. Use a cream such as Cetaphil, Lubriderm, or Moisturel that does not irritate the skin or cause a rash. Apply the cream while your skin is still damp after lightly drying with a towel. · Use cold, wet cloths to reduce itching. · Keep cool, and stay out of the sun. · If itching affects your normal activities, an over-the-counter antihistamine, such as diphenhydramine (Benadryl) or loratadine (Claritin) may help. Read and follow all instructions on the label. · Control scratching. Keep your fingernails trimmed and smooth to prevent damage to the skin when you scratch it. Wearing cotton mittens or gloves can help you stop scratching. · Try to avoid things that trigger your rash. These may include things like allergens, such as pollen or animal dander.  Harsh soaps, scratchy clothes, stress, and some foods are other examples. When should you call for help? Call your doctor now or seek immediate medical care if:    · Your rash gets worse and you have a fever.     · You have new blisters or bruises, or the rash spreads and looks like a sunburn.     · You have signs of infection, such as:  ? Increased pain, swelling, warmth, or redness. ? Red streaks leading from the rash. ? Pus draining from the rash. ? A fever.     · You have crusting or oozing sores.     · You have joint aches or body aches along with your rash. Watch closely for changes in your health, and be sure to contact your doctor if:    · Your rash does not clear up after 2 to 3 weeks of home treatment.     · Itching interferes with your sleep or daily activities. Where can you learn more? Go to http://www.gray.com/  Enter I585 in the search box to learn more about \"Atopic Dermatitis: Care Instructions. \"  Current as of: March 3, 2021               Content Version: 13.0  © 2006-2021 [employer]. Care instructions adapted under license by [employer] (which disclaims liability or warranty for this information). If you have questions about a medical condition or this instruction, always ask your healthcare professional. [place] [address] any warranty or liability for your use of this information. Well Visit, Women 48 to 72: Care Instructions  Overview     Well visits can help you stay healthy. Your doctor has checked your overall health and may have suggested ways to take good care of yourself. Your doctor also may have recommended tests. At home, you can help prevent illness with healthy eating, regular exercise, and other steps. Follow-up care is a key part of your treatment and safety. Be sure to make and go to all appointments, and call your doctor if you are having problems.  It's also a good idea to know your test results and keep a list of the medicines you take. How can you care for yourself at home? · Get screening tests that you and your doctor decide on. Screening helps find diseases before any symptoms appear. · Eat healthy foods. Choose fruits, vegetables, whole grains, protein, and low-fat dairy foods. Limit fat, especially saturated fat. Reduce salt in your diet. · Limit alcohol. Have no more than 1 drink a day or 7 drinks a week. · Get at least 30 minutes of exercise on most days of the week. Walking is a good choice. You also may want to do other activities, such as running, swimming, cycling, or playing tennis or team sports. · Reach and stay at a healthy weight. This will lower your risk for many problems, such as obesity, diabetes, heart disease, and high blood pressure. · Do not smoke. Smoking can make health problems worse. If you need help quitting, talk to your doctor about stop-smoking programs and medicines. These can increase your chances of quitting for good. · Care for your mental health. It is easy to get weighed down by worry and stress. Learn strategies to manage stress, like deep breathing and mindfulness, and stay connected with your family and community. If you find you often feel sad or hopeless, talk with your doctor. Treatment can help. · Talk to your doctor about whether you have any risk factors for sexually transmitted infections (STIs). You can help prevent STIs if you wait to have sex with a new partner (or partners) until you've each been tested for STIs. It also helps if you use condoms (male or female condoms) and if you limit your sex partners to one person who only has sex with you. Vaccines are available for some STIs. · If you think you may have a problem with alcohol or drug use, talk to your doctor. This includes prescription medicines (such as amphetamines and opioids) and illegal drugs (such as cocaine and methamphetamine).  Your doctor can help you figure out what type of treatment is best for you. · Protect your skin from too much sun. When you're outdoors from 10 a.m. to 4 p.m., stay in the shade or cover up with clothing and a hat with a wide brim. Wear sunglasses that block UV rays. Even when it's cloudy, put broad-spectrum sunscreen (SPF 30 or higher) on any exposed skin. · See a dentist one or two times a year for checkups and to have your teeth cleaned. · Wear a seat belt in the car. When should you call for help? Watch closely for changes in your health, and be sure to contact your doctor if you have any problems or symptoms that concern you. Where can you learn more? Go to http://www.gray.com/  Enter [id number] in the search box to learn more about \"Well Visit, Women 50 to 72: Care Instructions. \"  Current as of: February 11, 2021               Content Version: 13.0  © 2006-2021 [employer]. Care instructions adapted under license by [employer] (which disclaims liability or warranty for this information). If you have questions about a medical condition or this instruction, always ask your healthcare professional. [address] any warranty or liability for your use of this information.

## 2021-12-29 NOTE — PROGRESS NOTES
SUBJECTIVE:   64 y.o. female for annual routine Pap and checkup. Complains of itching at groin area. Denies vaginal discharge. Reports anxiety has improved significantly on Lexapro 5 mg daily. Mammogram 12/13/21: normal    Current Outpatient Medications   Medication Sig Dispense Refill    albuterol (PROVENTIL HFA, VENTOLIN HFA, PROAIR HFA) 90 mcg/actuation inhaler INHALE 2 PUFFS EVERY 6 HOURS AS NEEDED FOR WHEEZING 8.5 g 3    Euthyrox 50 mcg tablet TAKE 1 TABLET BY MOUTH ONCE DAILY BEFORE BREAKFAST 90 Tablet 3    potassium chloride (KAON 20%) 40 mEq/15 mL liqd TAKE 3.75ML BY MOUTH DAILY 113 mL 1    mometasone (ELOCON) 0.1 % topical cream Apply twice daily to affected area. 60 g 0    montelukast (SINGULAIR) 10 mg tablet TAKE ONE TABLET BY MOUTH ONE TIME DAILY  90 Tablet 1    triamcinolone acetonide (KENALOG) 0.025 % topical cream       escitalopram oxalate (LEXAPRO) 5 mg tablet TAKE 1 TABLET BY MOUTH ONCE DAILY 30 Tablet 2    furosemide (LASIX) 20 mg tablet Take 1 Tablet by mouth daily as needed (leg swelling). 30 Tablet 2    amLODIPine (NORVASC) 10 mg tablet TAKE 1 TABLET BY MOUTH ONCE DAILY . APPOINTMENT REQUIRED FOR FUTURE REFILLS 90 Tablet 0    Symbicort 160-4.5 mcg/actuation HFAA INHALE 2 PUFFS BY MOUTH TWICE DAILY. NO REFILLS WITHOUT APPOINTMENT 33 g 0    desonide (TRIDESILON) 0.05 % topical lotion Apply  to affected area two (2) times a day. Do not use for >2 weeks at at time. For eczema. 59 mL 0    cholecalciferol (Vitamin D3) 25 mcg (1,000 unit) cap Take  by mouth daily.  albuterol (PROVENTIL VENTOLIN) 2.5 mg /3 mL (0.083 %) nebulizer solution 3 mL by Nebulization route every four (4) hours as needed for Wheezing. 24 Each 0    inhalational spacing device (POCKET CHAMBER) Use as directed with albuterol inhaler prn 1 Device 0    MULTIVITAMIN (MULTIPLE VITAMIN PO) Take 1 Tab by mouth daily (with breakfast).        Allergies: Latex, Seafood [shellfish containing products], Tree nut, Lisinopril, Bactrim [sulfamethoprim ds], Banana, Iodine, Lisinopril, Povidone, and Sulfa (sulfonamide antibiotics)   Patient's last menstrual period was 10/20/2014. GYN ROS: normal menses, no abnormal bleeding, pelvic pain or discharge, no breast pain or new or enlarging lumps on self exam. No neurological complaints. OBJECTIVE:   The patient appears well, alert, oriented x 3, in no distress. Visit Vitals  BP (!) 148/82 (BP 1 Location: Right arm, BP Patient Position: Sitting, BP Cuff Size: Large adult)   Pulse 94   Temp 98.2 °F (36.8 °C) (Oral)   Resp 16   Ht 5' 3\" (1.6 m)   Wt 212 lb 9.6 oz (96.4 kg)   LMP 10/20/2014   SpO2 92%   BMI 37.66 kg/m²       PELVIC EXAM: normal external genitalia, vulva, vagina, uterus and adnexa. Strawberry cervix with red dots across cervix. Cervix easily friable. ASSESSMENT AND PLAN    ICD-10-CM ICD-9-CM    1. Well woman exam with routine gynecological exam  Z01.419 V72.31 PAP IG, APTIMA HPV AND RFX 16/18,45 ([id number])      PAP IG, APTIMA HPV AND RFX 16/18,45 ([id number])   2. Atopic dermatitis, unspecified type  L20.9 691.8 triamcinolone acetonide (KENALOG) 0.1 % topical cream   3. Primary hypertension  I10 401.9    4. Anxiety  F41.9 300.00      Diagnoses and all orders for this visit:    1. Well woman exam with routine gynecological exam  Has strawberry cervix, making me suspicious for trichomonas. Will await results. -     PAP IG, APTIMA HPV AND RFX 16/18,45 ([id number]); Future    2. Atopic dermatitis, unspecified type  Affecting external genitalia. Reported triamcinolone 0.25% did not help itching.  -     Start triamcinolone acetonide (KENALOG) 0.1 % topical cream; Apply  to affected area two (2) times daily as needed for Itching. Use thin layer    3. Primary hypertension  BP mildly elevated today. 4. Anxiety  Well-controlled on Lexapro 5 mg daily.       Follow-up and Dispositions    · Return in about 6 months (around 6/29/2022), or if symptoms worsen or fail to improve, for HTN, thyroid, anxiety. I have discussed the diagnosis with the patient and the intended plan as seen in the above orders. Patient is in agreement. The patient has received an after-visit summary and questions were answered concerning future plans. I have discussed medication side effects and warnings with the patient as well.

## 2021-12-29 NOTE — PROGRESS NOTES
Franko Ohs  Identified pt with two pt identifiers(name and ). Chief Complaint   Patient presents with    Gyn Exam    Anxiety    Dry Skin     Pt states that she has eczema on vaginal area, would like cream for it. Uses triamcinolone        Reviewed record In preparation for visit and have obtained necessary documentation. 1. Have you been to the ER, urgent care clinic or hospitalized since your last visit? No     2. Have you seen or consulted any other health care providers outside of the [address] [place] [address] since your last visit? Include any pap smears or colon screening. No    Patient does not have an advance directive. Vitals reviewed with provider.     Health Maintenance reviewed:     Health Maintenance Due   Topic    Cervical cancer screen           Wt Readings from Last 3 Encounters:   21 212 lb 9.6 oz (96.4 kg)   10/27/21 212 lb (96.2 kg)   20 203 lb (92.1 kg)        Temp Readings from Last 3 Encounters:   21 98.2 °F (36.8 °C) (Oral)   10/27/21 98.4 °F (36.9 °C) (Oral)   20 98.1 °F (36.7 °C) (Oral)        BP Readings from Last 3 Encounters:   21 (!) 148/82   10/27/21 138/80   20 136/83        Pulse Readings from Last 3 Encounters:   21 94   10/27/21 81   20 78        Vitals:    21 1342 21 1401   BP: (!) 143/85 (!) 148/82   Pulse: 94    Resp: 16    Temp: 98.2 °F (36.8 °C)    TempSrc: Oral    SpO2: 92%    Weight: 212 lb 9.6 oz (96.4 kg)    Height: 5' 3\" (1.6 m)    PainSc:   0 - No pain    LMP: 10/20/2014          Learning Assessment:   :       Learning Assessment 7/10/2014   PRIMARY LEARNER Patient   HIGHEST LEVEL OF EDUCATION - PRIMARY LEARNER  GRADUATED HIGH SCHOOL OR GED   BARRIERS PRIMARY LEARNER NONE   PRIMARY LANGUAGE ENGLISH   LEARNER PREFERENCE PRIMARY READING   ANSWERED BY patient        Depression Screening:   :       3 most recent PHQ Screens 2021   Little interest or pleasure in doing things Not at all Feeling down, depressed, irritable, or hopeless Not at all   Total Score PHQ 2 0        Fall Risk Assessment:   :       Fall Risk Assessment, last 12 mths 12/29/2021   Able to walk? Yes   Fall in past 12 months? 0   Do you feel unsteady? 0   Are you worried about falling 0        Abuse Screening:   :       Abuse Screening Questionnaire 6/18/2020   Do you ever feel afraid of your partner? N   Are you in a relationship with someone who physically or mentally threatens you? N   Is it safe for you to go home?  Y        ADL Screening:   :       ADL Assessment 6/18/2020   Feeding yourself No Help Needed   Getting from bed to chair No Help Needed   Getting dressed No Help Needed   Bathing or showering No Help Needed   Walk across the room (includes cane/walker) No Help Needed   Using the telphone No Help Needed   Taking your medications No Help Needed   Preparing meals No Help Needed   Managing money (expenses/bills) No Help Needed   Moderately strenuous housework (laundry) No Help Needed   Shopping for personal items (toiletries/medicines) No Help Needed   Shopping for groceries No Help Needed   Driving No Help Needed   Climbing a flight of stairs No Help Needed   Getting to places beyond walking distances No Help Needed

## 2022-01-01 LAB
CYTOLOGIST CVX/VAG CYTO: NORMAL
CYTOLOGY CVX/VAG DOC CYTO: NORMAL
CYTOLOGY CVX/VAG DOC THIN PREP: NORMAL
DX ICD CODE: NORMAL
HPV I/H RISK 4 DNA CVX QL PROBE+SIG AMP: NEGATIVE
Lab: NORMAL
OTHER STN SPEC: NORMAL
STAT OF ADQ CVX/VAG CYTO-IMP: NORMAL

## 2022-01-04 DIAGNOSIS — A59.01 TRICHOMONAS VAGINITIS: Primary | ICD-10-CM

## 2022-01-04 RX ORDER — METRONIDAZOLE 500 MG/1
500 TABLET ORAL 2 TIMES DAILY
Qty: 14 TABLET | Refills: 0 | Status: SHIPPED | OUTPATIENT
Start: 2022-01-04 | End: 2022-01-11

## 2022-01-04 NOTE — PROGRESS NOTES
Your Pap smear returned normal. Dr. Ranjith Scott saw changes on your cervix consistent with Trichomonas infection. She has sent an antibiotic called metronidazole to your pharmacy. Your next Pap smear is due at age 72.

## 2022-03-18 PROBLEM — E87.6 HYPOKALEMIA: Status: ACTIVE | Noted: 2021-11-18

## 2022-03-19 PROBLEM — E55.9 VITAMIN D DEFICIENCY: Status: ACTIVE | Noted: 2020-07-02

## 2022-03-19 PROBLEM — R73.03 PREDIABETES: Status: ACTIVE | Noted: 2020-07-02

## 2022-03-19 PROBLEM — E66.01 SEVERE OBESITY (HCC): Status: ACTIVE | Noted: 2020-08-18

## 2022-03-20 PROBLEM — E78.00 HYPERCHOLESTEROLEMIA: Status: ACTIVE | Noted: 2021-11-13

## 2022-05-24 ENCOUNTER — VIRTUAL VISIT (OUTPATIENT)
Dept: INTERNAL MEDICINE CLINIC | Age: 62
End: 2022-05-24
Payer: COMMERCIAL

## 2022-05-24 DIAGNOSIS — L20.9 ATOPIC DERMATITIS, UNSPECIFIED TYPE: Primary | ICD-10-CM

## 2022-05-24 DIAGNOSIS — E78.00 HYPERCHOLESTEROLEMIA: ICD-10-CM

## 2022-05-24 DIAGNOSIS — R73.03 PREDIABETES: ICD-10-CM

## 2022-05-24 DIAGNOSIS — E55.9 VITAMIN D DEFICIENCY: ICD-10-CM

## 2022-05-24 DIAGNOSIS — I10 PRIMARY HYPERTENSION: ICD-10-CM

## 2022-05-24 DIAGNOSIS — F41.1 GENERALIZED ANXIETY DISORDER: ICD-10-CM

## 2022-05-24 DIAGNOSIS — E03.9 ACQUIRED HYPOTHYROIDISM: ICD-10-CM

## 2022-05-24 PROBLEM — F41.9 ANXIETY: Status: ACTIVE | Noted: 2022-05-24

## 2022-05-24 PROCEDURE — 99213 OFFICE O/P EST LOW 20 MIN: CPT | Performed by: INTERNAL MEDICINE

## 2022-05-24 RX ORDER — ESCITALOPRAM OXALATE 5 MG/1
5 TABLET ORAL DAILY
Qty: 90 TABLET | Refills: 3 | Status: CANCELLED | OUTPATIENT
Start: 2022-05-24

## 2022-05-24 RX ORDER — METHYLPREDNISOLONE 4 MG/1
TABLET ORAL
Qty: 1 DOSE PACK | Refills: 0 | Status: SHIPPED | OUTPATIENT
Start: 2022-05-24 | End: 2022-06-29 | Stop reason: ALTCHOICE

## 2022-05-24 RX ORDER — ESCITALOPRAM OXALATE 5 MG/1
5 TABLET ORAL DAILY
Qty: 90 TABLET | Refills: 3 | Status: SHIPPED | OUTPATIENT
Start: 2022-05-24

## 2022-05-24 NOTE — PROGRESS NOTES
Ck Morrell is a 58 y.o. female who was seen by synchronous (real-time) audio-video technology on 5/24/2022 for Rash        Assessment & Plan:     Diagnoses and all orders for this visit:    1. Atopic dermatitis, unspecified type  Current rash likely due to atopic dermatitis. Also consider urticaria or photosensitivity reaction. After finishing Medrol dose pack, resume triamcinolone cream if needed. -     Start methylPREDNISolone (MEDROL DOSEPACK) 4 mg tablet; Use for 6 days following package instructions. 2. Generalized anxiety disorder  Controlled. -     Refill escitalopram oxalate (LEXAPRO) 5 mg tablet; Take 1 Tablet by mouth daily. 3. Hypercholesterolemia  Will address at next clinic visit. -     LIPID PANEL; Future    4. Primary hypertension  Will address at next clinic visit. -     METABOLIC PANEL, COMPREHENSIVE; Future  -     CBC WITH AUTOMATED DIFF; Future    5. Prediabetes  Will address at next clinic visit.  -     HEMOGLOBIN A1C WITH EAG; Future    6. Vitamin D deficiency  Will address at next clinic visit. -     VITAMIN D, 25 HYDROXY; Future    7. Acquired hypothyroidism  Will address at next clinic visit. -     THYROID CASCADE PROFILE; Future      Follow-up and Dispositions    · Return if symptoms worsen or fail to improve, for Scheduled appointment on 6/29/22; have fasting labs done 1 week prior to appointment. 712  Subjective:     Complains of itchy bumps on her arms that started a few days ago. Bumps are red and in clusters. Started after she was outdoors for a long time during the [employer]. No improvement with triamcinolone 0.1% cream. Had similar rash on arms in the past; usually occurs in the late spring or early summer. Denies new lotions, soaps, detergents, cleaning products, or known exposure to poison ivy or oak.  Requests prescription for prednisone because it has helped rash in the past.     Prior to Admission medications    Medication Sig Start Date End Date Taking? Authorizing Provider   montelukast (SINGULAIR) 10 mg tablet TAKE ONE TABLET BY MOUTH ONE TIME DAILY 5/9/22  Yes Precious Mathew MD   triamcinolone acetonide (KENALOG) 0.1 % topical cream APPLY THIN LAYER OF CREAM EXTERNALLY TO AFFECTED AREA TWICE DAILY AS NEEDED FOR ITCHING 4/17/22  Yes Mary Grace Mcguire MD   Symbicort 160-4.5 mcg/actuation HFAA INHALE 2 PUFFS BY MOUTH TWICE DAILY . APPOINTMENT REQUIRED FOR FUTURE REFILLS 4/17/22  Yes Blake Mathew MD   escitalopram oxalate (LEXAPRO) 5 mg tablet Take 1 tablet by mouth once daily 1/31/22  Yes Mary Grace Mcguire MD   amLODIPine (NORVASC) 10 mg tablet TAKE 1 TABLET BY MOUTH ONCE DAILY. Indications: high blood pressure 1/3/22  Yes Precious Mathew MD   albuterol (PROVENTIL HFA, VENTOLIN HFA, PROAIR HFA) 90 mcg/actuation inhaler INHALE 2 PUFFS EVERY 6 HOURS AS NEEDED FOR WHEEZING 12/22/21  Yes Precious Mathew MD   Euthyrox 50 mcg tablet TAKE 1 TABLET BY MOUTH ONCE DAILY BEFORE BREAKFAST 12/2/21  Yes Mary Grace Mcguire MD   potassium chloride (KAON 20%) 40 mEq/15 mL liqd TAKE 3.75ML BY MOUTH DAILY 11/18/21  Yes Precious Mathew MD   mometasone (ELOCON) 0.1 % topical cream Apply twice daily to affected area. 11/2/21  Yes Mary Grace Mcguire MD   furosemide (LASIX) 20 mg tablet Take 1 Tablet by mouth daily as needed (leg swelling). 10/27/21  Yes Blake Mathew MD   cholecalciferol (Vitamin D3) 25 mcg (1,000 unit) cap Take  by mouth daily. Yes Provider, Historical   albuterol (PROVENTIL VENTOLIN) 2.5 mg /3 mL (0.083 %) nebulizer solution 3 mL by Nebulization route every four (4) hours as needed for Wheezing. 3/30/17  Yes Naman Guardado MD   inhalational spacing device (POCKET CHAMBER) Use as directed with albuterol inhaler prn 6/30/16  Yes Linda Gallagher MD   MULTIVITAMIN (MULTIPLE VITAMIN PO) Take 1 Tab by mouth daily (with breakfast).    Yes Provider, Historical     Patient Active Problem List   Diagnosis Code    Intraductal papilloma D36.9    Hypothyroidism E03.9    Hypertension I10    Allergic rhinitis J30.9    Asthma J45.909    Seborrhea L21.9    Atopic dermatitis L20.9    Incidental pulmonary nodule, less than or equal to 3mm R91.1    Prediabetes R73.03    Vitamin D deficiency E55.9    Severe obesity (HCC) E66.01    Hypercholesterolemia E78.00    Hypokalemia E87.6    Generalized anxiety disorder F41.1       Objective:     Patient-Reported Vitals 6/18/2020   Patient-Reported Height 5f3   Patient-Reported LMP no menses      General: alert, cooperative, no distress   Mental  status: normal mood, behavior, speech, dress, motor activity, and thought processes, able to follow commands   HENT: NCAT   Neck: no visualized mass   Resp: no respiratory distress   Neuro: no gross deficits   Skin: no discoloration or lesions of concern on visible areas   Psychiatric: normal affect, consistent with stated mood, no evidence of hallucinations     Additional exam findings: rash difficult to see clearly on video    We discussed the expected course, resolution and complications of the diagnosis(es) in detail. Medication risks, benefits, costs, interactions, and alternatives were discussed as indicated. I advised her to contact the office if her condition worsens, changes or fails to improve as anticipated. She expressed understanding with the diagnosis(es) and plan. THIS VISIT WAS COMPLETED VIRTUALLY USING MY CHART TELEMEDICINE    [facility], was evaluated through a synchronous (real-time) audio-video encounter. The patient (or guardian if applicable) is aware that this is a billable service, which includes applicable co-pays. Verbal consent to proceed has been obtained. The visit was conducted pursuant to the emergency declaration under the [address] [place] [address] authority and the [employer] General Act.   Patient identification was verified, and a caregiver was present when appropriate. The patient was located at home in a state where the provider was licensed to provide care.     Sayra Calderón MD

## 2022-05-24 NOTE — PROGRESS NOTES
Drake Wyatt  Identified pt with two pt identifiers(name and ). Chief Complaint   Patient presents with    Rash       Reviewed record In preparation for visit and have obtained necessary documentation. 1. Have you been to the ER, urgent care clinic or hospitalized since your last visit? No     2. Have you seen or consulted any other health care providers outside of the [address] [place] [address] since your last visit? Include any pap smears or colon screening. No    Vitals reviewed with provider. Health Maintenance reviewed:     Health Maintenance Due   Topic    Pneumococcal 0-64 years (2 - PCV)          Wt Readings from Last 3 Encounters:   21 212 lb 9.6 oz (96.4 kg)   10/27/21 212 lb (96.2 kg)   20 203 lb (92.1 kg)        Temp Readings from Last 3 Encounters:   21 98.2 °F (36.8 °C) (Oral)   10/27/21 98.4 °F (36.9 °C) (Oral)   20 98.1 °F (36.7 °C) (Oral)        BP Readings from Last 3 Encounters:   21 (!) 148/82   10/27/21 138/80   20 136/83        Pulse Readings from Last 3 Encounters:   21 94   10/27/21 81   20 78        Vitals:    22 1100   PainSc:   0 - No pain   LMP: 10/20/2014          Learning Assessment:   :       Learning Assessment 7/10/2014   PRIMARY LEARNER Patient   HIGHEST LEVEL OF EDUCATION - PRIMARY LEARNER  GRADUATED HIGH SCHOOL OR GED   BARRIERS PRIMARY LEARNER NONE   PRIMARY LANGUAGE ENGLISH   LEARNER PREFERENCE PRIMARY READING   ANSWERED BY patient        Depression Screening:   :       3 most recent PHQ Screens 2021   Little interest or pleasure in doing things Not at all   Feeling down, depressed, irritable, or hopeless Not at all   Total Score PHQ 2 0        Fall Risk Assessment:   :       Fall Risk Assessment, last 12 mths 2021   Able to walk? Yes   Fall in past 12 months? 0   Do you feel unsteady?  0   Are you worried about falling 0        Abuse Screening:   :       Abuse Screening Questionnaire 2020 Do you ever feel afraid of your partner? N   Are you in a relationship with someone who physically or mentally threatens you? N   Is it safe for you to go home?  Y        ADL Screening:   :       ADL Assessment 6/18/2020   Feeding yourself No Help Needed   Getting from bed to chair No Help Needed   Getting dressed No Help Needed   Bathing or showering No Help Needed   Walk across the room (includes cane/walker) No Help Needed   Using the telphone No Help Needed   Taking your medications No Help Needed   Preparing meals No Help Needed   Managing money (expenses/bills) No Help Needed   Moderately strenuous housework (laundry) No Help Needed   Shopping for personal items (toiletries/medicines) No Help Needed   Shopping for groceries No Help Needed   Driving No Help Needed   Climbing a flight of stairs No Help Needed   Getting to places beyond walking distances No Help Needed

## 2022-05-24 NOTE — LETTER
7/15/2022 12:20 PM    [address] [facility] [id number]    Results for orders placed or performed in visit on 00/49/45   METABOLIC PANEL, COMPREHENSIVE   Result Value Ref Range    Glucose 89 65 - 99 mg/dL    BUN 12 8 - 27 mg/dL    Creatinine 0.79 0.57 - 1.00 mg/dL    eGFR 85 >59 mL/min/1.73    BUN/Creatinine ratio 15 12 - 28    Sodium 145 (H) 134 - 144 mmol/L    Potassium 4.0 3.5 - 5.2 mmol/L    Chloride 102 96 - 106 mmol/L    CO2 26 20 - 29 mmol/L    Calcium 9.2 8.7 - 10.3 mg/dL    Protein, total 7.0 6.0 - 8.5 g/dL    Albumin 4.2 3.8 - 4.8 g/dL    GLOBULIN, TOTAL 2.8 1.5 - 4.5 g/dL    A-G Ratio 1.5 1.2 - 2.2    Bilirubin, total 0.4 0.0 - 1.2 mg/dL    Alk. phosphatase 117 44 - 121 IU/L    AST (SGOT) 18 0 - 40 IU/L    ALT (SGPT) 13 0 - 32 IU/L   CBC WITH AUTOMATED DIFF   Result Value Ref Range    WBC 5.9 3.4 - 10.8 x10E3/uL    RBC 5.71 (H) 3.77 - 5.28 x10E6/uL    HGB 12.9 11.1 - 15.9 g/dL    HCT 41.6 34.0 - 46.6 %    MCV 73 (L) 79 - 97 fL    MCH 22.6 (L) 26.6 - 33.0 pg    MCHC 31.0 (L) 31.5 - 35.7 g/dL    RDW 17.5 (H) 11.7 - 15.4 %    PLATELET 534 471 - 263 x10E3/uL    NEUTROPHILS 60 Not Estab. %    Lymphocytes 27 Not Estab. %    MONOCYTES 10 Not Estab. %    EOSINOPHILS 2 Not Estab. %    BASOPHILS 1 Not Estab. %    ABS. NEUTROPHILS 3.6 1.4 - 7.0 x10E3/uL    Abs Lymphocytes 1.6 0.7 - 3.1 x10E3/uL    ABS. MONOCYTES 0.6 0.1 - 0.9 x10E3/uL    ABS. EOSINOPHILS 0.1 0.0 - 0.4 x10E3/uL    ABS. BASOPHILS 0.0 0.0 - 0.2 x10E3/uL    IMMATURE GRANULOCYTES 0 Not Estab. %    ABS. IMM.  GRANS. 0.0 0.0 - 0.1 x10E3/uL   LIPID PANEL   Result Value Ref Range    Cholesterol, total 195 100 - 199 mg/dL    Triglyceride 103 0 - 149 mg/dL    HDL Cholesterol 65 >39 mg/dL    VLDL, calculated 18 5 - 40 mg/dL    LDL, calculated 112 (H) 0 - 99 mg/dL   VITAMIN D, 25 HYDROXY   Result Value Ref Range    VITAMIN D, 25-HYDROXY 70.7 30.0 - 100.0 ng/mL   THYROID CASCADE PROFILE   Result Value Ref Range    TSH 0.743 0.450 - 4.500 uIU/mL   HEMOGLOBIN A1C WITH EAG   Result Value Ref Range    Hemoglobin A1c 6.1 (H) 4.8 - 5.6 %    Estimated average glucose 128 mg/dL             RECOMMENDATIONS  Message sent to patient by My Chart:  Your labs showed sodium a little high This is usually due to mild dehydration so try to drink more water. You have prediabetes that is better than it was 2 years ago. Your total cholesterol was borderline high. To lower cholesterol, work on diet, exercise, and weight loss. For diet, increase fruits, vegetables, and low-fat dairy products (such as low-fat yogurt, 1% or skim milk), and decrease fried foods, fast foods, beef, pork, walsh, sausage, and hot dogs.      Your other labs were normal, including kidney and liver tests, blood counts, vitamin D, and thyroid.             Sincerely,      Bernerd Halsted, MD

## 2022-06-29 ENCOUNTER — OFFICE VISIT (OUTPATIENT)
Dept: INTERNAL MEDICINE CLINIC | Age: 62
End: 2022-06-29
Payer: COMMERCIAL

## 2022-06-29 VITALS
SYSTOLIC BLOOD PRESSURE: 127 MMHG | HEIGHT: 63 IN | HEART RATE: 90 BPM | OXYGEN SATURATION: 95 % | WEIGHT: 215.5 LBS | TEMPERATURE: 99.1 F | BODY MASS INDEX: 38.18 KG/M2 | RESPIRATION RATE: 12 BRPM | DIASTOLIC BLOOD PRESSURE: 78 MMHG

## 2022-06-29 DIAGNOSIS — E78.00 HYPERCHOLESTEROLEMIA: ICD-10-CM

## 2022-06-29 DIAGNOSIS — E66.01 SEVERE OBESITY (HCC): ICD-10-CM

## 2022-06-29 DIAGNOSIS — I10 PRIMARY HYPERTENSION: Primary | ICD-10-CM

## 2022-06-29 DIAGNOSIS — E03.9 ACQUIRED HYPOTHYROIDISM: ICD-10-CM

## 2022-06-29 DIAGNOSIS — L20.9 ATOPIC DERMATITIS, UNSPECIFIED TYPE: ICD-10-CM

## 2022-06-29 DIAGNOSIS — F41.1 GENERALIZED ANXIETY DISORDER: ICD-10-CM

## 2022-06-29 PROCEDURE — 99214 OFFICE O/P EST MOD 30 MIN: CPT | Performed by: INTERNAL MEDICINE

## 2022-06-29 NOTE — PROGRESS NOTES
CC:   Chief Complaint   Patient presents with    Hypertension    Thyroid Problem    Anxiety       HISTORY OF PRESENT ILLNESS  Drake Wyatt is a 58 y.o. female. Presents for 6 month follow up evaluation. She has HTN, hypothyroidism, asthma, allergic rhinitis, atopic dermatitis, and generalized anxiety disorder.       Complains of occasional muscle cramps in legs. Has history of hypokalemia. Has not been taking KCl daily. Rash that occurred after being out in the sun last month resolved. Working on weight loss. Drinking more water, less junk food. Still eats potatoes and bread; will try to cut down. Denies HA's, CP, SOB, dizziness, or heart palpitations. Has chronic mild leg swelling. Thyroid ROS: has fatigue, denies weight changes, heat/cold intolerance, bowel/skin changes or CVS symptoms. Taking medication as prescribed. Last TSH was normal (11/3/21: 0.616). Soc Hx  Single, lives alone in an apartment. Has 1 son and 1 grandchild. Works at [employer] in housekeeping. Never smoker. Denies alcohol or recreational drug use. Does much walking at work. Drinks coffee occasionally. Alka Ray is her co-worker. ROS  A complete review of systems was performed and is negative except for those mentioned in the HPI.        Patient Active Problem List   Diagnosis Code    Intraductal papilloma D36.9    Hypothyroidism E03.9    Hypertension I10    Allergic rhinitis J30.9    Asthma J45.909    Seborrhea L21.9    Atopic dermatitis L20.9    Incidental pulmonary nodule, less than or equal to 3mm R91.1    Prediabetes R73.03    Vitamin D deficiency E55.9    Severe obesity ([address] E66.01    Hypercholesterolemia E78.00    Hypokalemia E87.6    Generalized anxiety disorder F41.1     Past Medical History:   Diagnosis Date    Abnormal uterine bleeding     Asthma     Hayfever     History of mammogram july 2013    History of mammography, screening 6/2013    Hypertension     Iron deficiency anemia     Menopause     Other ill-defined conditions(799.89) 1981    blood transfusion hx--vaginal delivery    Pap smear for cervical cancer screening 2011    normal    Pap smear for cervical cancer screening 2012    Sinus disease     Thyroid disease     HYPOTHYROID    Unspecified adverse effect of anesthesia     HAD TO FORCE MOUTH OPEN WHEN PT SEDATED     Allergies   Allergen Reactions    Latex Rash    Seafood [Shellfish Containing Products] Angioedema     THROAT SWELLS    Tree Nut Anaphylaxis     Throat swelling    Lisinopril Not Reported This Time     LIPS SWELL    Bactrim [Sulfamethoprim Ds] Other (comments)     BLISTERS ON HANDS AND FEET    Banana Other (comments) and Angioedema     mouth became red and itchy.  Iodine Other (comments)     Created bumps, soreness-betadine    Lisinopril Swelling     Angioedema on 11/25/2010    Povidone Rash    Sulfa (Sulfonamide Antibiotics) Rash and Itching       Current Outpatient Medications   Medication Sig Dispense Refill    escitalopram oxalate (LEXAPRO) 5 mg tablet Take 1 Tablet by mouth daily. 90 Tablet 3    montelukast (SINGULAIR) 10 mg tablet TAKE ONE TABLET BY MOUTH ONE TIME DAILY 90 Tablet 3    triamcinolone acetonide (KENALOG) 0.1 % topical cream APPLY THIN LAYER OF CREAM EXTERNALLY TO AFFECTED AREA TWICE DAILY AS NEEDED FOR ITCHING 15 g 2    Symbicort 160-4.5 mcg/actuation HFAA INHALE 2 PUFFS BY MOUTH TWICE DAILY . APPOINTMENT REQUIRED FOR FUTURE REFILLS 33 g 2    amLODIPine (NORVASC) 10 mg tablet TAKE 1 TABLET BY MOUTH ONCE DAILY.   Indications: high blood pressure 90 Tablet 3    albuterol (PROVENTIL HFA, VENTOLIN HFA, PROAIR HFA) 90 mcg/actuation inhaler INHALE 2 PUFFS EVERY 6 HOURS AS NEEDED FOR WHEEZING 8.5 g 3    Euthyrox 50 mcg tablet TAKE 1 TABLET BY MOUTH ONCE DAILY BEFORE BREAKFAST 90 Tablet 3    potassium chloride (KAON 20%) 40 mEq/15 mL liqd TAKE 3.75ML BY MOUTH DAILY 113 mL 1    mometasone (ELOCON) 0.1 % topical cream Apply twice daily to affected area. 60 g 0    furosemide (LASIX) 20 mg tablet Take 1 Tablet by mouth daily as needed (leg swelling). 30 Tablet 2    cholecalciferol (Vitamin D3) 25 mcg (1,000 unit) cap Take  by mouth daily.  albuterol (PROVENTIL VENTOLIN) 2.5 mg /3 mL (0.083 %) nebulizer solution 3 mL by Nebulization route every four (4) hours as needed for Wheezing. 24 Each 0    inhalational spacing device (POCKET CHAMBER) Use as directed with albuterol inhaler prn 1 Device 0    MULTIVITAMIN (MULTIPLE VITAMIN PO) Take 1 Tab by mouth daily (with breakfast). PHYSICAL EXAM  Visit Vitals  /78 (BP 1 Location: Left upper arm, BP Patient Position: Sitting)   Pulse 90   Temp 99.1 °F (37.3 °C) (Oral)   Resp 12   Ht 5' 3\" (1.6 m)   Wt 215 lb 8 oz (97.8 kg)   LMP 10/20/2014   SpO2 95%   BMI 38.17 kg/m²   3 lb weight increase since last clinic visit 6 months ago    General: Obese, no distress. HEENT:  Head normocephalic/atraumatic, no scleral icterus  Neck: Supple. Unchanged mild right-sided thyromegaly. No JVD or lymphadenopathy. Lungs:  Clear to ausculation bilaterally. Good air movement. Heart:  Regular rate and rhythm, normal S1 and S2, no murmur, gallop, or rub  Abdomen: Soft, non-distended, normal bowel sounds, no tenderness, no guarding, masses, rebound tenderness, or HSM. Extremities: No clubbing, cyanosis. Trace pitting edema at bilateral LE's (L >R). 2+ pedal pulses bilaterally.   Neurological: Alert and oriented. Psychiatric: Normal mood and affect. Behavior is normal.         ASSESSMENT AND PLAN    ICD-10-CM ICD-9-CM    1. Primary hypertension  I10 401.9    2. Hypercholesterolemia  E78.00 272.0    3. Generalized anxiety disorder  F41.1 300.02    4. Acquired hypothyroidism  E03.9 244.9    5. Atopic dermatitis, unspecified type  L20.9 691.8    6. Severe obesity ([facility] [address]  E66.01 278.01      Diagnoses and all orders for this visit:    1. Primary hypertension  Well-controlled.  Continue amlodipine 10 mg daily. 2. Hypercholesterolemia  11/23/21: tot chol 202, . Not controlled in 11/21. Recheck fasting lipid panel. 3. Generalized anxiety disorder  Controlled on Lexapro 5 mg daily. 4. Acquired hypothyroidism  Asymptomatic. 5. Atopic dermatitis, unspecified type  Controlled. Avoid prolonged sun exposure. 6. Severe obesity ([address]  Counseled on diet, exercise, and weight loss. She did not have her labs done before this appointment. Plans to have it done soon. Follow-up and Dispositions    · Return in about 6 months (around 12/29/2022), or if symptoms worsen or fail to improve, for HTN, chol, thyroid. I have discussed the diagnosis with the patient and the intended plan as seen in the above orders. Patient is in agreement. The patient has received an after-visit summary and questions were answered concerning future plans. I have discussed medication side effects and warnings with the patient as well.

## 2022-06-29 NOTE — PATIENT INSTRUCTIONS
When You Are Overweight: Care Instructions  Your Care Instructions     If you're overweight, your doctor may recommend that you make changes in your eating and exercise habits. Being overweight can lead to serious health problems, such as high blood pressure, heart disease, type 2 diabetes, and arthritis, or it can make these problems worse. Eating a healthy diet and being more active can help you reach and stay at a healthy weight. You don't have to make huge changes all at once. Start by making small changes in your eating and exercise habits. To lose weight, you need to burn more calories than you take in. You can do this by eating healthy foods in reasonable amounts and becoming more active every day. Follow-up care is a key part of your treatment and safety. Be sure to make and go to all appointments, and call your doctor if you are having problems. It's also a good idea to know your test results and keep a list of the medicines you take. How can you care for yourself at home? · Improve your eating habits. You'll be more successful if you work on changing one eating habit at a time. All foods, if eaten in moderation, can be part of healthy eating. Remember to:  ? Eat a variety of foods from each food group. Include grains, vegetables, fruits, dairy, and protein foods. ? Limit foods high in fat, sugar, and calories. ? Eat slowly. And don't do anything else, such as watch TV, while you are eating. ? Pay attention to portion sizes. Put your food on a smaller plate. ? Plan your meals ahead of time. You'll be less likely to grab something that's not as healthy. · Get active. Regular activity can help you feel better, have more energy, and burn more calories. If you haven't been active, start slowly. Start with at least 30 minutes of moderate activity on most days of the week. Then gradually increase the amount of activity. Try for 60 or 90 minutes a day, at least 5 days a week.  There are a lot of ways to fit activity into your life. You can:  ? Walk or bike to the store. Or walk with a friend, or walk the dog.  ? Mow the lawn, rake leaves, shovel snow, or do some gardening. ? Use the stairs instead of the elevator, at least for a few floors. · Change your thinking. Your thoughts have a lot to do with how you feel and what you do. When you're trying to reach a healthy weight, changing how you think about certain things may help. Here are some ideas:  ? Don't compare yourself to others. Healthy bodies come in all shapes and sizes. ? Pay attention to how hungry or full you feel. When you eat, be aware of why you're eating and how much you're eating. ? Focus on improving your health instead of dieting. Dieting almost never works over the long term. · Ask your doctor about other health professionals who can help you reach a healthy weight. ? A dietitian can help you make healthy changes in your diet. ? An exercise specialist or  can help you develop a safe and effective exercise program.  ? A counselor or psychiatrist can help you cope with issues such as depression, anxiety, or family problems that can make it hard to focus on reaching a healthy weight. · Get support from your family, your doctor, your friends, a support group--and support yourself. Where can you learn more? Go to http://www.gray.com/  Enter W987 in the search box to learn more about \"When You Are Overweight: Care Instructions. \"  Current as of: December 27, 2021               Content Version: 13.2  © 0722-0259 [employer]. Care instructions adapted under license by [employer] (which disclaims liability or warranty for this information). If you have questions about a medical condition or this instruction, always ask your healthcare professional. [address] any warranty or liability for your use of this information.

## 2022-06-29 NOTE — PROGRESS NOTES
Smita Milligan  Identified pt with two pt identifiers(name and ). Chief Complaint   Patient presents with    Hypertension    Thyroid Problem    Anxiety       Reviewed record In preparation for visit and have obtained necessary documentation. 1. Have you been to the ER, urgent care clinic or hospitalized since your last visit? No     2. Have you seen or consulted any other health care providers outside of the [address] [place] [address] since your last visit? Include any pap smears or colon screening. No    Vitals reviewed with provider.     Health Maintenance reviewed:     Health Maintenance Due   Topic    Pneumococcal 0-64 years (2 - PCV)          Wt Readings from Last 3 Encounters:   22 215 lb 8 oz (97.8 kg)   21 212 lb 9.6 oz (96.4 kg)   10/27/21 212 lb (96.2 kg)        Temp Readings from Last 3 Encounters:   22 99.1 °F (37.3 °C) (Oral)   21 98.2 °F (36.8 °C) (Oral)   10/27/21 98.4 °F (36.9 °C) (Oral)        BP Readings from Last 3 Encounters:   22 127/78   21 (!) 148/82   10/27/21 138/80        Pulse Readings from Last 3 Encounters:   22 90   21 94   10/27/21 81        Vitals:    22 1321   BP: 127/78   Pulse: 90   Resp: 12   Temp: 99.1 °F (37.3 °C)   TempSrc: Oral   SpO2: 95%   Weight: 215 lb 8 oz (97.8 kg)   Height: 5' 3\" (1.6 m)   PainSc:   0 - No pain   LMP: 10/20/2014          Learning Assessment:   :       Learning Assessment 7/10/2014   PRIMARY LEARNER Patient   HIGHEST LEVEL OF EDUCATION - PRIMARY LEARNER  GRADUATED HIGH SCHOOL OR GED   BARRIERS PRIMARY LEARNER NONE   PRIMARY LANGUAGE ENGLISH   LEARNER PREFERENCE PRIMARY READING   ANSWERED BY patient        Depression Screening:   :       3 most recent PHQ Screens 2022   Little interest or pleasure in doing things Not at all   Feeling down, depressed, irritable, or hopeless Not at all   Total Score PHQ 2 0        Fall Risk Assessment:   :       Fall Risk Assessment, last 12 mths 12/29/2021   Able to walk? Yes   Fall in past 12 months? 0   Do you feel unsteady? 0   Are you worried about falling 0        Abuse Screening:   :       Abuse Screening Questionnaire 6/29/2022 6/18/2020   Do you ever feel afraid of your partner? N N   Are you in a relationship with someone who physically or mentally threatens you? N N   Is it safe for you to go home?  Y Y        ADL Screening:   :       ADL Assessment 6/18/2020   Feeding yourself No Help Needed   Getting from bed to chair No Help Needed   Getting dressed No Help Needed   Bathing or showering No Help Needed   Walk across the room (includes cane/walker) No Help Needed   Using the telphone No Help Needed   Taking your medications No Help Needed   Preparing meals No Help Needed   Managing money (expenses/bills) No Help Needed   Moderately strenuous housework (laundry) No Help Needed   Shopping for personal items (toiletries/medicines) No Help Needed   Shopping for groceries No Help Needed   Driving No Help Needed   Climbing a flight of stairs No Help Needed   Getting to places beyond walking distances No Help Needed

## 2022-07-09 LAB
25(OH)D3+25(OH)D2 SERPL-MCNC: 70.7 NG/ML (ref 30–100)
ALBUMIN SERPL-MCNC: 4.2 G/DL (ref 3.8–4.8)
ALBUMIN/GLOB SERPL: 1.5 {RATIO} (ref 1.2–2.2)
ALP SERPL-CCNC: 117 IU/L (ref 44–121)
ALT SERPL-CCNC: 13 IU/L (ref 0–32)
AST SERPL-CCNC: 18 IU/L (ref 0–40)
BASOPHILS # BLD AUTO: 0 X10E3/UL (ref 0–0.2)
BASOPHILS NFR BLD AUTO: 1 %
BILIRUB SERPL-MCNC: 0.4 MG/DL (ref 0–1.2)
BUN SERPL-MCNC: 12 MG/DL (ref 8–27)
BUN/CREAT SERPL: 15 (ref 12–28)
CALCIUM SERPL-MCNC: 9.2 MG/DL (ref 8.7–10.3)
CHLORIDE SERPL-SCNC: 102 MMOL/L (ref 96–106)
CHOLEST SERPL-MCNC: 195 MG/DL (ref 100–199)
CO2 SERPL-SCNC: 26 MMOL/L (ref 20–29)
CREAT SERPL-MCNC: 0.79 MG/DL (ref 0.57–1)
EGFR: 85 ML/MIN/1.73
EOSINOPHIL # BLD AUTO: 0.1 X10E3/UL (ref 0–0.4)
EOSINOPHIL NFR BLD AUTO: 2 %
ERYTHROCYTE [DISTWIDTH] IN BLOOD BY AUTOMATED COUNT: 17.5 % (ref 11.7–15.4)
EST. AVERAGE GLUCOSE BLD GHB EST-MCNC: 128 MG/DL
GLOBULIN SER CALC-MCNC: 2.8 G/DL (ref 1.5–4.5)
GLUCOSE SERPL-MCNC: 89 MG/DL (ref 65–99)
HBA1C MFR BLD: 6.1 % (ref 4.8–5.6)
HCT VFR BLD AUTO: 41.6 % (ref 34–46.6)
HDLC SERPL-MCNC: 65 MG/DL
HGB BLD-MCNC: 12.9 G/DL (ref 11.1–15.9)
IMM GRANULOCYTES # BLD AUTO: 0 X10E3/UL (ref 0–0.1)
IMM GRANULOCYTES NFR BLD AUTO: 0 %
LDLC SERPL CALC-MCNC: 112 MG/DL (ref 0–99)
LYMPHOCYTES # BLD AUTO: 1.6 X10E3/UL (ref 0.7–3.1)
LYMPHOCYTES NFR BLD AUTO: 27 %
MCH RBC QN AUTO: 22.6 PG (ref 26.6–33)
MCHC RBC AUTO-ENTMCNC: 31 G/DL (ref 31.5–35.7)
MCV RBC AUTO: 73 FL (ref 79–97)
MONOCYTES # BLD AUTO: 0.6 X10E3/UL (ref 0.1–0.9)
MONOCYTES NFR BLD AUTO: 10 %
NEUTROPHILS # BLD AUTO: 3.6 X10E3/UL (ref 1.4–7)
NEUTROPHILS NFR BLD AUTO: 60 %
PLATELET # BLD AUTO: 369 X10E3/UL (ref 150–450)
POTASSIUM SERPL-SCNC: 4 MMOL/L (ref 3.5–5.2)
PROT SERPL-MCNC: 7 G/DL (ref 6–8.5)
RBC # BLD AUTO: 5.71 X10E6/UL (ref 3.77–5.28)
SODIUM SERPL-SCNC: 145 MMOL/L (ref 134–144)
TRIGL SERPL-MCNC: 103 MG/DL (ref 0–149)
TSH SERPL DL<=0.005 MIU/L-ACNC: 0.74 UIU/ML (ref 0.45–4.5)
VLDLC SERPL CALC-MCNC: 18 MG/DL (ref 5–40)
WBC # BLD AUTO: 5.9 X10E3/UL (ref 3.4–10.8)

## 2022-07-10 NOTE — PROGRESS NOTES
Message sent to patient by My Chart:Your labs showed sodium a little high This is usually due to mild dehydration so try to drink more water. You have prediabetes that is better than it was 2 years ago. Your total cholesterol was borderline high. To lower cholesterol, work on diet, exercise, and weight loss. For diet, increase fruits, vegetables, and low-fat dairy products (such as low-fat yogurt, 1% or skim milk), and decrease fried foods, fast foods, beef, pork, walsh, sausage, and hot dogs. Your other labs were normal, including kidney and liver tests, blood counts, vitamin D, and thyroid.     Dr. Lily Bueno

## 2022-09-15 DIAGNOSIS — E87.6 HYPOKALEMIA: ICD-10-CM

## 2022-09-16 RX ORDER — POTASSIUM CHLORIDE 40 MEQ/15ML
SOLUTION ORAL
Qty: 113 ML | Refills: 0 | Status: SHIPPED | OUTPATIENT
Start: 2022-09-16

## 2022-10-20 ENCOUNTER — HOSPITAL ENCOUNTER (EMERGENCY)
Age: 62
Discharge: HOME | End: 2022-10-20
Attending: EMERGENCY MEDICINE
Payer: COMMERCIAL

## 2022-10-20 ENCOUNTER — APPOINTMENT (OUTPATIENT)
Dept: GENERAL RADIOLOGY | Age: 62
End: 2022-10-20
Attending: PHYSICIAN ASSISTANT
Payer: COMMERCIAL

## 2022-10-20 VITALS
HEIGHT: 63 IN | DIASTOLIC BLOOD PRESSURE: 84 MMHG | SYSTOLIC BLOOD PRESSURE: 201 MMHG | OXYGEN SATURATION: 96 % | HEART RATE: 72 BPM | BODY MASS INDEX: 38.09 KG/M2 | WEIGHT: 215 LBS | TEMPERATURE: 98.2 F | RESPIRATION RATE: 18 BRPM

## 2022-10-20 DIAGNOSIS — S16.1XXA ACUTE CERVICAL MYOFASCIAL STRAIN, INITIAL ENCOUNTER: ICD-10-CM

## 2022-10-20 DIAGNOSIS — M54.2 ACUTE NECK PAIN: Primary | ICD-10-CM

## 2022-10-20 DIAGNOSIS — I10 ESSENTIAL HYPERTENSION: ICD-10-CM

## 2022-10-20 DIAGNOSIS — M50.30 DDD (DEGENERATIVE DISC DISEASE), CERVICAL: ICD-10-CM

## 2022-10-20 PROCEDURE — 72050 X-RAY EXAM NECK SPINE 4/5VWS: CPT

## 2022-10-20 PROCEDURE — 99283 EMERGENCY DEPT VISIT LOW MDM: CPT

## 2022-10-20 RX ORDER — CYCLOBENZAPRINE HCL 10 MG
10 TABLET ORAL
Qty: 20 TABLET | Refills: 0 | Status: SHIPPED | OUTPATIENT
Start: 2022-10-20

## 2022-10-20 RX ORDER — IBUPROFEN 800 MG/1
800 TABLET ORAL
Qty: 20 TABLET | Refills: 0 | Status: SHIPPED | OUTPATIENT
Start: 2022-10-20 | End: 2022-10-27
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
## 2022-10-20 NOTE — LETTER
[address]  [facility] EMERGENCY DEPT  [address] [facility] [address] [facility]  [place] [address]  [phone]    Work/School Note    Date: 10/20/2022    To Whom It May concern:    Adin Osborn was seen and treated today in the emergency room by the following provider(s):  Attending Provider: Татьяна Pérez MD  Physician Assistant: BAILEY Joseph. Adin Osborn may return to work on [phone].     Sincerely,          BAILEY Yun
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
## 2022-11-08 ENCOUNTER — TELEPHONE (OUTPATIENT)
Dept: INTERNAL MEDICINE CLINIC | Age: 62
End: 2022-11-08

## 2022-11-09 NOTE — TELEPHONE ENCOUNTER
Verified pt name and date of birth. Pt states issues Started Sunday, complains that right  thigh out side area numb. Denied:  discoloration \"no bruising\"   Warmness   coldness \"don't feel anything\"   affecting walking -moving okay   Pt indicated she had a fall 4-5 weeks ago   Without injury. Pt requesting appointment for evaluation? When would you like to see her?

## 2022-11-10 ENCOUNTER — OFFICE VISIT (OUTPATIENT)
Dept: INTERNAL MEDICINE CLINIC | Age: 62
End: 2022-11-10
Payer: COMMERCIAL

## 2022-11-10 VITALS
HEIGHT: 63 IN | OXYGEN SATURATION: 93 % | SYSTOLIC BLOOD PRESSURE: 132 MMHG | WEIGHT: 212.8 LBS | RESPIRATION RATE: 20 BRPM | DIASTOLIC BLOOD PRESSURE: 84 MMHG | BODY MASS INDEX: 37.7 KG/M2 | TEMPERATURE: 98.5 F | HEART RATE: 81 BPM

## 2022-11-10 DIAGNOSIS — G57.12 MERALGIA PARESTHETICA OF LEFT SIDE: Primary | ICD-10-CM

## 2022-11-10 DIAGNOSIS — J06.9 VIRAL URI: ICD-10-CM

## 2022-11-10 PROCEDURE — 99213 OFFICE O/P EST LOW 20 MIN: CPT | Performed by: INTERNAL MEDICINE

## 2022-11-10 PROCEDURE — 3078F DIAST BP <80 MM HG: CPT | Performed by: INTERNAL MEDICINE

## 2022-11-10 PROCEDURE — 3074F SYST BP LT 130 MM HG: CPT | Performed by: INTERNAL MEDICINE

## 2022-11-10 NOTE — PROGRESS NOTES
Lon Wright  Identified pt with two pt identifiers(name and ). Chief Complaint   Patient presents with    Numbness     Left thigh       Reviewed record In preparation for visit and have obtained necessary documentation. 1. Have you been to the ER, urgent care clinic or hospitalized since your last visit? No     2. Have you seen or consulted any other health care providers outside of the [address] [place] [address] since your last visit? Include any pap smears or colon screening. No    Vitals reviewed with provider.     Health Maintenance reviewed:     Health Maintenance Due   Topic    Pneumococcal 0-64 years (2 - PCV)    COVID-19 Vaccine (4 - Booster for Moderna series)          Wt Readings from Last 3 Encounters:   11/10/22 212 lb 12.8 oz (96.5 kg)   10/20/22 215 lb (97.5 kg)   22 215 lb 8 oz (97.8 kg)        Temp Readings from Last 3 Encounters:   11/10/22 98.5 °F (36.9 °C) (Oral)   10/20/22 98.2 °F (36.8 °C)   22 99.1 °F (37.3 °C) (Oral)        BP Readings from Last 3 Encounters:   11/10/22 132/84   10/20/22 (!) 201/84   22 127/78        Pulse Readings from Last 3 Encounters:   11/10/22 81   10/20/22 72   22 90        Vitals:    11/10/22 0953   BP: 132/84   Pulse: 81   Resp: 20   Temp: 98.5 °F (36.9 °C)   TempSrc: Oral   SpO2: 93%   Weight: 212 lb 12.8 oz (96.5 kg)   Height: 5' 3\" (1.6 m)   PainSc:   0 - No pain   LMP: 10/20/2014          Learning Assessment:   :       Learning Assessment 7/10/2014   PRIMARY LEARNER Patient   HIGHEST LEVEL OF EDUCATION - PRIMARY LEARNER  GRADUATED HIGH SCHOOL OR GED   BARRIERS PRIMARY LEARNER NONE   PRIMARY LANGUAGE ENGLISH   LEARNER PREFERENCE PRIMARY READING   ANSWERED BY patient        Depression Screening:   :       3 most recent PHQ Screens 2022   Little interest or pleasure in doing things Not at all   Feeling down, depressed, irritable, or hopeless Not at all   Total Score PHQ 2 0        Fall Risk Assessment:   :       Fall Risk Assessment, last 12 mths 12/29/2021   Able to walk? Yes   Fall in past 12 months? 0   Do you feel unsteady? 0   Are you worried about falling 0        Abuse Screening:   :       Abuse Screening Questionnaire 6/29/2022 6/18/2020   Do you ever feel afraid of your partner? N N   Are you in a relationship with someone who physically or mentally threatens you? N N   Is it safe for you to go home?  Y Y        ADL Screening:   :       ADL Assessment 6/18/2020   Feeding yourself No Help Needed   Getting from bed to chair No Help Needed   Getting dressed No Help Needed   Bathing or showering No Help Needed   Walk across the room (includes cane/walker) No Help Needed   Using the telphone No Help Needed   Taking your medications No Help Needed   Preparing meals No Help Needed   Managing money (expenses/bills) No Help Needed   Moderately strenuous housework (laundry) No Help Needed   Shopping for personal items (toiletries/medicines) No Help Needed   Shopping for groceries No Help Needed   Driving No Help Needed   Climbing a flight of stairs No Help Needed   Getting to places beyond walking distances No Help Needed
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
## 2022-11-10 NOTE — LETTER
NOTIFICATION RETURN TO WORK / SCHOOL    11/10/2022 10:14 AM    [facility]  [address] [place] [address] Abner Bake [address] [place] [id number]      To Whom It May Concern:    [facility] is currently under the care of [address] [place] [address]. Please excuse her for missing work on 11/10 and 11/11/22. She will return to work/school on: Monday, 11/14/22. If there are questions or concerns please have the patient contact our office.         Sincerely,      Beto Cortez MD

## 2023-01-09 ENCOUNTER — OFFICE VISIT (OUTPATIENT)
Dept: INTERNAL MEDICINE CLINIC | Age: 63
End: 2023-01-09
Payer: COMMERCIAL

## 2023-01-09 VITALS
DIASTOLIC BLOOD PRESSURE: 80 MMHG | RESPIRATION RATE: 12 BRPM | HEART RATE: 74 BPM | WEIGHT: 211 LBS | OXYGEN SATURATION: 95 % | BODY MASS INDEX: 37.39 KG/M2 | TEMPERATURE: 98.9 F | SYSTOLIC BLOOD PRESSURE: 152 MMHG | HEIGHT: 63 IN

## 2023-01-09 DIAGNOSIS — Z12.31 ENCOUNTER FOR SCREENING MAMMOGRAM FOR MALIGNANT NEOPLASM OF BREAST: ICD-10-CM

## 2023-01-09 DIAGNOSIS — E66.01 SEVERE OBESITY (BMI 35.0-39.9) WITH COMORBIDITY (HCC): ICD-10-CM

## 2023-01-09 DIAGNOSIS — I10 PRIMARY HYPERTENSION: Primary | ICD-10-CM

## 2023-01-09 DIAGNOSIS — E78.00 HYPERCHOLESTEROLEMIA: ICD-10-CM

## 2023-01-09 PROCEDURE — 3077F SYST BP >= 140 MM HG: CPT | Performed by: INTERNAL MEDICINE

## 2023-01-09 PROCEDURE — 99214 OFFICE O/P EST MOD 30 MIN: CPT | Performed by: INTERNAL MEDICINE

## 2023-01-09 PROCEDURE — 3079F DIAST BP 80-89 MM HG: CPT | Performed by: INTERNAL MEDICINE

## 2023-01-09 RX ORDER — SPIRONOLACTONE 25 MG/1
25 TABLET ORAL DAILY
Qty: 30 TABLET | Refills: 2 | Status: SHIPPED | OUTPATIENT
Start: 2023-01-09

## 2023-01-09 NOTE — PROGRESS NOTES
CC:   Chief Complaint   Patient presents with    Hypertension    Cholesterol Problem    Hypothyroidism       HISTORY OF PRESENT ILLNESS  Orlin Syed is a 58 y.o. female. Presents for 6 month follow up evaluation. She has HTN, hypothyroidism, asthma, allergic rhinitis, atopic dermatitis, and generalized anxiety disorder. No complaints. No longer having left thigh pain. Improved after keeping her phone on right side. Denies HA's, CP, SOB, dizziness, or heart palpitations. Has chronic mild leg swelling. Tries to stay on low-fat diet. Thyroid ROS: has fatigue, denies weight changes, heat/cold intolerance, bowel/skin changes or CVS symptoms. Taking medication as prescribed. Last TSH was normal     COVID vaccine: had 3/4 vaccines, declined further vaccines  Pap smear: scheduled to see gynecologist on 2/6/23    ROS  A complete review of systems was performed and is negative except for those mentioned in the HPI.       Patient Active Problem List   Diagnosis Code    Intraductal papilloma D36.9    Hypothyroidism E03.9    Hypertension I10    Allergic rhinitis J30.9    Asthma J45.909    Seborrhea L21.9    Atopic dermatitis L20.9    Incidental pulmonary nodule, less than or equal to 3mm R91.1    Prediabetes R73.03    Vitamin D deficiency E55.9    Severe obesity ([address] E66.01    Hypercholesterolemia E78.00    Hypokalemia E87.6    Generalized anxiety disorder F41.1     Past Medical History:   Diagnosis Date    Abnormal uterine bleeding     Asthma     Hayfever     History of mammogram july 2013    History of mammography, screening 6/2013    Hypertension     Iron deficiency anemia     Menopause     Other ill-defined conditions(799.89) 1981    blood transfusion hx--vaginal delivery    Pap smear for cervical cancer screening 2011    normal    Pap smear for cervical cancer screening 2012    Sinus disease     Thyroid disease     HYPOTHYROID    Unspecified adverse effect of anesthesia     HAD TO FORCE MOUTH OPEN WHEN PT SEDATED     Allergies   Allergen Reactions    Latex Rash    Seafood [Shellfish Containing Products] Angioedema     THROAT SWELLS    Tree Nut Anaphylaxis     Throat swelling    Lisinopril Not Reported This Time     LIPS SWELL    Bactrim [Sulfamethoprim Ds] Other (comments)     BLISTERS ON HANDS AND FEET    Banana Other (comments) and Angioedema     mouth became red and itchy. Iodine Other (comments)     Created bumps, soreness-betadine    Lisinopril Swelling     Angioedema on 11/25/2010    Povidone Rash    Sulfa (Sulfonamide Antibiotics) Rash and Itching       Current Outpatient Medications   Medication Sig Dispense Refill    triamcinolone acetonide (KENALOG) 0.1 % topical cream APPLY A THIN LAYER OF CREAM TOPICALLY TO AFFECTED AREA TWICE DAILY AS NEEDED FOR ITCHING 15 g 0    levothyroxine (SYNTHROID) 50 mcg tablet TAKE 1 TABLET BY MOUTH ONCE DAILY BEFORE BREAKFAST 90 Tablet 3    cyclobenzaprine (FLEXERIL) 10 mg tablet Take 1 Tablet by mouth three (3) times daily as needed for Muscle Spasm(s). 20 Tablet 0    potassium chloride (KAON 20%) 40 mEq/15 mL liqd take 3.75 mls daily 113 mL 0    escitalopram oxalate (LEXAPRO) 5 mg tablet Take 1 Tablet by mouth daily. 90 Tablet 3    montelukast (SINGULAIR) 10 mg tablet TAKE ONE TABLET BY MOUTH ONE TIME DAILY 90 Tablet 3    Symbicort 160-4.5 mcg/actuation HFAA INHALE 2 PUFFS BY MOUTH TWICE DAILY . APPOINTMENT REQUIRED FOR FUTURE REFILLS 33 g 2    amLODIPine (NORVASC) 10 mg tablet TAKE 1 TABLET BY MOUTH ONCE DAILY. Indications: high blood pressure 90 Tablet 3    albuterol (PROVENTIL HFA, VENTOLIN HFA, PROAIR HFA) 90 mcg/actuation inhaler INHALE 2 PUFFS EVERY 6 HOURS AS NEEDED FOR WHEEZING 8.5 g 3    mometasone (ELOCON) 0.1 % topical cream Apply twice daily to affected area. 60 g 0    furosemide (LASIX) 20 mg tablet Take 1 Tablet by mouth daily as needed (leg swelling). 30 Tablet 2    cholecalciferol (VITAMIN D3) 25 mcg (1,000 unit) cap Take  by mouth daily.       albuterol (PROVENTIL VENTOLIN) 2.5 mg /3 mL (0.083 %) nebulizer solution 3 mL by Nebulization route every four (4) hours as needed for Wheezing. 24 Each 0    MULTIVITAMIN (MULTIPLE VITAMIN PO) Take 1 Tab by mouth daily (with breakfast). PHYSICAL EXAM  Visit Vitals  BP (!) 144/85 (BP 1 Location: Left upper arm, BP Patient Position: Sitting)   Pulse 74   Temp 98.9 °F (37.2 °C) (Oral)   Resp 12   Ht 5' 3\" (1.6 m)   Wt 211 lb (95.7 kg)   LMP 10/20/2014   SpO2 95%   BMI 37.38 kg/m²   Repeat /80    General: Obese, no distress. HEENT:  Head normocephalic/atraumatic, no scleral icterus  Lungs:  Clear to ausculation bilaterally. Good air movement. Heart:  Regular rate and rhythm, normal S1 and S2, no murmur, gallop, or rub  Extremities: No clubbing, cyanosis, or edema. 2+ pedal pulses. Neurological: Alert and oriented. Psychiatric: Normal mood and affect. Behavior is normal.         ASSESSMENT AND PLAN    ICD-10-CM ICD-9-CM    1. Primary hypertension  I10 401.9 spironolactone (ALDACTONE) 25 mg tablet      2. Hypercholesterolemia  E78.00 272.0       3. Severe obesity (BMI 35.0-39. 9) with comorbidity ([facility]  E66.01 278.01       4. Encounter for screening mammogram for malignant neoplasm of breast  Z12.31 V76.12 [facility] MAMMO BI SCREENING INCL CAD        Diagnoses and all orders for this visit:    1. Primary hypertension  -     spironolactone (ALDACTONE) 25 mg tablet; Take 1 Tablet by mouth daily. 2. Hypercholesterolemia    3. Severe obesity (BMI 35.0-39. 9) with comorbidity ([facility]    4. Encounter for screening mammogram for malignant neoplasm of breast  -     [facility] MAMMO BI SCREENING INCL CAD; Future    HTN not controlled. Continue amlodipine 10 mg daily and add spironolactone 25 mg daily. Referral for mammogram. She mistakenly thought she was scheduled for mammogram today. Counseled on low-salt/low-fat diet, exercise, and weight loss.   Plan to order labs at next appointment to be done before July appointment. Follow-up and Dispositions    Return in about 2 months (around 3/9/2023), or if symptoms worsen or fail to improve, for HTN (2-3 months). I have discussed the diagnosis with the patient and the intended plan as seen in the above orders. Patient is in agreement. The patient has received an after-visit summary and questions were answered concerning future plans. I have discussed medication side effects and warnings with the patient as well.

## 2023-01-09 NOTE — PROGRESS NOTES
Estefania Gianluca  Identified pt with two pt identifiers(name and ). Chief Complaint   Patient presents with    Hypertension    Cholesterol Problem    Hypothyroidism       Reviewed record In preparation for visit and have obtained necessary documentation. 1. Have you been to the ER, urgent care clinic or hospitalized since your last visit? No     2. Have you seen or consulted any other health care providers outside of the [address] [place] [address] since your last visit? Include any pap smears or colon screening. No    Vitals reviewed with provider.     Health Maintenance reviewed:     Health Maintenance Due   Topic    Pneumococcal 0-64 years (2 - PCV)    COVID-19 Vaccine (4 - Booster for Moderna series)          Wt Readings from Last 3 Encounters:   23 211 lb (95.7 kg)   11/10/22 212 lb 12.8 oz (96.5 kg)   10/20/22 215 lb (97.5 kg)        Temp Readings from Last 3 Encounters:   23 98.9 °F (37.2 °C) (Oral)   11/10/22 98.5 °F (36.9 °C) (Oral)   10/20/22 98.2 °F (36.8 °C)        BP Readings from Last 3 Encounters:   23 (!) 144/85   11/10/22 132/84   10/20/22 (!) 201/84        Pulse Readings from Last 3 Encounters:   23 74   11/10/22 81   10/20/22 72        Vitals:    23 1436   BP: (!) 144/85   Pulse: 74   Resp: 12   Temp: 98.9 °F (37.2 °C)   TempSrc: Oral   SpO2: 95%   Weight: 211 lb (95.7 kg)   Height: 5' 3\" (1.6 m)   PainSc:   0 - No pain   LMP: 10/20/2014          Learning Assessment:   :       Learning Assessment 7/10/2014   PRIMARY LEARNER Patient   HIGHEST LEVEL OF EDUCATION - PRIMARY LEARNER  GRADUATED HIGH SCHOOL OR GED   BARRIERS PRIMARY LEARNER NONE   PRIMARY LANGUAGE ENGLISH   LEARNER PREFERENCE PRIMARY READING   ANSWERED BY patient        Depression Screening:   :       3 most recent [facility] 36 Screens 2022   Little interest or pleasure in doing things Not at all   Feeling down, depressed, irritable, or hopeless Not at all   Total Score PHQ 2 0        Fall Risk Assessment:   :       Fall Risk Assessment, last 12 mths 12/29/2021   Able to walk? Yes   Fall in past 12 months? 0   Do you feel unsteady? 0   Are you worried about falling 0        Abuse Screening:   :       Abuse Screening Questionnaire 6/29/2022 6/18/2020   Do you ever feel afraid of your partner? N N   Are you in a relationship with someone who physically or mentally threatens you? N N   Is it safe for you to go home?  Y Y        ADL Screening:   :       ADL Assessment 6/18/2020   Feeding yourself No Help Needed   Getting from bed to chair No Help Needed   Getting dressed No Help Needed   Bathing or showering No Help Needed   Walk across the room (includes cane/walker) No Help Needed   Using the telphone No Help Needed   Taking your medications No Help Needed   Preparing meals No Help Needed   Managing money (expenses/bills) No Help Needed   Moderately strenuous housework (laundry) No Help Needed   Shopping for personal items (toiletries/medicines) No Help Needed   Shopping for groceries No Help Needed   Driving No Help Needed   Climbing a flight of stairs No Help Needed   Getting to places beyond walking distances No Help Needed

## 2023-01-11 ENCOUNTER — HOSPITAL ENCOUNTER (OUTPATIENT)
Dept: MAMMOGRAPHY | Age: 63
Discharge: HOME | End: 2023-01-11
Attending: INTERNAL MEDICINE
Payer: COMMERCIAL

## 2023-01-11 DIAGNOSIS — Z12.31 ENCOUNTER FOR SCREENING MAMMOGRAM FOR MALIGNANT NEOPLASM OF BREAST: ICD-10-CM

## 2023-01-11 PROCEDURE — 77067 SCR MAMMO BI INCL CAD: CPT

## 2023-01-31 DIAGNOSIS — L20.9 ATOPIC DERMATITIS, UNSPECIFIED TYPE: ICD-10-CM

## 2023-01-31 RX ORDER — TRIAMCINOLONE ACETONIDE 1 MG/G
CREAM TOPICAL
Qty: 15 G | Refills: 0 | Status: SHIPPED | OUTPATIENT
Start: 2023-01-31

## 2023-01-31 RX ORDER — MOMETASONE FUROATE 1 MG/G
CREAM TOPICAL
Qty: 60 G | Refills: 0 | Status: SHIPPED | OUTPATIENT
Start: 2023-01-31

## 2023-03-09 ENCOUNTER — OFFICE VISIT (OUTPATIENT)
Dept: INTERNAL MEDICINE CLINIC | Age: 63
End: 2023-03-09
Payer: COMMERCIAL

## 2023-03-09 VITALS
SYSTOLIC BLOOD PRESSURE: 120 MMHG | RESPIRATION RATE: 18 BRPM | BODY MASS INDEX: 38.45 KG/M2 | OXYGEN SATURATION: 94 % | TEMPERATURE: 98.1 F | DIASTOLIC BLOOD PRESSURE: 73 MMHG | HEART RATE: 67 BPM | WEIGHT: 217 LBS | HEIGHT: 63 IN

## 2023-03-09 DIAGNOSIS — I10 ESSENTIAL HYPERTENSION: Primary | ICD-10-CM

## 2023-03-09 DIAGNOSIS — E78.00 HYPERCHOLESTEROLEMIA: ICD-10-CM

## 2023-03-09 DIAGNOSIS — R73.03 PREDIABETES: ICD-10-CM

## 2023-03-09 DIAGNOSIS — E55.9 VITAMIN D DEFICIENCY: ICD-10-CM

## 2023-03-09 DIAGNOSIS — E03.9 ACQUIRED HYPOTHYROIDISM: ICD-10-CM

## 2023-03-09 PROCEDURE — 3078F DIAST BP <80 MM HG: CPT | Performed by: INTERNAL MEDICINE

## 2023-03-09 PROCEDURE — 3074F SYST BP LT 130 MM HG: CPT | Performed by: INTERNAL MEDICINE

## 2023-03-09 PROCEDURE — 99213 OFFICE O/P EST LOW 20 MIN: CPT | Performed by: INTERNAL MEDICINE

## 2023-03-09 NOTE — PROGRESS NOTES
CC:   Chief Complaint   Patient presents with    Hypertension       HISTORY OF PRESENT ILLNESS  Migdalia Valdez is a 61 y.o. female. Presents for 2 month follow up evaluation of HTN. /85 at last appointment. Spironolactone 25 mg daily added to amlodipine 10 mg daily. Today reports no side effects. Denies CP, SOB, dizziness, or leg swelling. Thinks her BP goes up when she has to care for her mother. Has home BP monitor but not checked within past month. Patient Active Problem List   Diagnosis Code    Intraductal papilloma D36.9    Hypothyroidism E03.9    Hypertension I10    Allergic rhinitis J30.9    Asthma J45.909    Seborrhea L21.9    Atopic dermatitis L20.9    Incidental pulmonary nodule, less than or equal to 3mm R91.1    Prediabetes R73.03    Vitamin D deficiency E55.9    Severe obesity (HCC) E66.01    Hypercholesterolemia E78.00    Hypokalemia E87.6    Generalized anxiety disorder F41.1     Past Medical History:   Diagnosis Date    Abnormal uterine bleeding     Asthma     Hayfever     History of mammogram july 2013    History of mammography, screening 6/2013    Hypertension     Iron deficiency anemia     Menopause     Other ill-defined conditions(799.89) 1981    blood transfusion hx--vaginal delivery    Pap smear for cervical cancer screening 2011    normal    Pap smear for cervical cancer screening 2012    Sinus disease     Thyroid disease     HYPOTHYROID    Unspecified adverse effect of anesthesia     HAD TO FORCE MOUTH OPEN WHEN PT SEDATED     Allergies   Allergen Reactions    Latex Rash    Seafood [Shellfish Containing Products] Angioedema     THROAT SWELLS    Tree Nut Anaphylaxis     Throat swelling    Lisinopril Not Reported This Time     LIPS SWELL    Bactrim [Sulfamethoprim Ds] Other (comments)     BLISTERS ON HANDS AND FEET    Banana Other (comments) and Angioedema     mouth became red and itchy.     Iodine Other (comments)     Created bumps, soreness-betadine    Lisinopril Swelling Angioedema on 11/25/2010    Povidone Rash    Sulfa (Sulfonamide Antibiotics) Rash and Itching       Current Outpatient Medications   Medication Sig Dispense Refill    mometasone (ELOCON) 0.1 % topical cream APPLY TWICE DAILY 60 g 0    triamcinolone acetonide (KENALOG) 0.1 % topical cream APPLY A THIN LAYER OF CREAM TOPICALLY TO AFFECTEED AREA TWICE DAILY AS NEEDED FOR ITCHING 15 g 0    albuterol (PROVENTIL HFA, VENTOLIN HFA, PROAIR HFA) 90 mcg/actuation inhaler inhale 2 puffs every 6 hours as needed for wheezing 8.5 g 1    amLODIPine (NORVASC) 10 mg tablet TAKE 1 TABLET BY MOUTH ONCE DAILY FOR HIGH BLOOD PRESSURE 90 Tablet 3    spironolactone (ALDACTONE) 25 mg tablet Take 1 Tablet by mouth daily. 30 Tablet 2    levothyroxine (SYNTHROID) 50 mcg tablet TAKE 1 TABLET BY MOUTH ONCE DAILY BEFORE BREAKFAST 90 Tablet 3    cyclobenzaprine (FLEXERIL) 10 mg tablet Take 1 Tablet by mouth three (3) times daily as needed for Muscle Spasm(s). 20 Tablet 0    potassium chloride (KAON 20%) 40 mEq/15 mL liqd take 3.75 mls daily 113 mL 0    escitalopram oxalate (LEXAPRO) 5 mg tablet Take 1 Tablet by mouth daily. 90 Tablet 3    montelukast (SINGULAIR) 10 mg tablet TAKE ONE TABLET BY MOUTH ONE TIME DAILY 90 Tablet 3    Symbicort 160-4.5 mcg/actuation HFAA INHALE 2 PUFFS BY MOUTH TWICE DAILY . APPOINTMENT REQUIRED FOR FUTURE REFILLS 33 g 2    furosemide (LASIX) 20 mg tablet Take 1 Tablet by mouth daily as needed (leg swelling). 30 Tablet 2    cholecalciferol (VITAMIN D3) 25 mcg (1,000 unit) cap Take  by mouth daily. albuterol (PROVENTIL VENTOLIN) 2.5 mg /3 mL (0.083 %) nebulizer solution 3 mL by Nebulization route every four (4) hours as needed for Wheezing. 24 Each 0    MULTIVITAMIN (MULTIPLE VITAMIN PO) Take 1 Tab by mouth daily (with breakfast).            PHYSICAL EXAM  Visit Vitals  /73 (BP 1 Location: Left upper arm, BP Patient Position: Sitting, BP Cuff Size: Large adult)   Pulse 67   Temp 98.1 °F (36.7 °C) (Oral)   Resp 18   Ht 5' 3\" (1.6 m)   Wt 217 lb (98.4 kg)   LMP 10/20/2014   SpO2 94%   BMI 38.44 kg/m²       General: Well-developed and well-nourished, no distress. HEENT:  Head normocephalic/atraumatic, no scleral icterus  Neck: Supple. No carotid bruits, JVD, lymphadenopathy, or thyromegaly. Lungs:  Clear to ausculation bilaterally. Good air movement. Heart:  Regular rate and rhythm, normal S1 and S2, no murmur, gallop, or rub  Extremities: No clubbing, cyanosis, or edema. Neurological: Alert and oriented. Psychiatric: Normal mood and affect. Behavior is normal.         ASSESSMENT AND PLAN    ICD-10-CM ICD-9-CM    1. Essential hypertension  R96 693.2 METABOLIC PANEL, BASIC      METABOLIC PANEL, BASIC      METABOLIC PANEL, COMPREHENSIVE      CBC WITH AUTOMATED DIFF      METABOLIC PANEL, COMPREHENSIVE      CBC WITH AUTOMATED DIFF      2. Acquired hypothyroidism  E03.9 244.9 TSH 3RD GENERATION      TSH 3RD GENERATION      3. Prediabetes  R73.03 790.29 HEMOGLOBIN A1C WITH EAG      HEMOGLOBIN A1C WITH EAG      4. Hypercholesterolemia  E78.00 272.0 LIPID PANEL      LIPID PANEL      5. Vitamin D deficiency  E55.9 268.9 VITAMIN D, 25 HYDROXY      VITAMIN D, 25 HYDROXY        Diagnoses and all orders for this visit:    1. Essential hypertension  -     METABOLIC PANEL, BASIC; Future  -     METABOLIC PANEL, COMPREHENSIVE; Future  -     CBC WITH AUTOMATED DIFF; Future    2. Acquired hypothyroidism  -     TSH 3RD GENERATION; Future    3. Prediabetes  -     HEMOGLOBIN A1C WITH EAG; Future    4. Hypercholesterolemia  -     LIPID PANEL; Future    5. Vitamin D deficiency  -     VITAMIN D, 25 HYDROXY; Future      HTN well-controlled at 120/73 today. Continue amlodipine and spironolactone. Check BMP to rule out hyperkalemia. Instructed her to check home BP when under increased taking her of her mother.     Follow-up and Dispositions    Return in about 6 months (around 9/9/2023), or if symptoms worsen or fail to improve, for HTN, chol, thyroid; have one lab within 2 wks. , have fasting fasting labs 1 week before Sept appt. I have discussed the diagnosis with the patient and the intended plan as seen in the above orders. Patient is in agreement. The patient has received an after-visit summary and questions were answered concerning future plans. I have discussed medication side effects and warnings with the patient as well.

## 2023-03-09 NOTE — PROGRESS NOTES
Sharifa Rock  Identified pt with two pt identifiers(name and ). No chief complaint on file. Reviewed record In preparation for visit and have obtained necessary documentation. 1. Have you been to the ER, urgent care clinic or hospitalized since your last visit? No     2. Have you seen or consulted any other health care providers outside of the [address] [place] [address] since your last visit? Include any pap smears or colon screening. No    Vitals reviewed with provider. Health Maintenance reviewed: There are no preventive care reminders to display for this patient. Wt Readings from Last 3 Encounters:   23 217 lb (98.4 kg)   23 211 lb (95.7 kg)   11/10/22 212 lb 12.8 oz (96.5 kg)        Temp Readings from Last 3 Encounters:   23 98.1 °F (36.7 °C) (Oral)   23 98.9 °F (37.2 °C) (Oral)   11/10/22 98.5 °F (36.9 °C) (Oral)        BP Readings from Last 3 Encounters:   23 120/73   23 (!) 152/80   11/10/22 132/84        Pulse Readings from Last 3 Encounters:   23 67   23 74   11/10/22 81        Vitals:    23 0959   BP: 120/73   Pulse: 67   Resp: 18   Temp: 98.1 °F (36.7 °C)   TempSrc: Oral   SpO2: 94%   Weight: 217 lb (98.4 kg)   Height: 5' 3\" (1.6 m)   PainSc:   0 - No pain   LMP: 10/20/2014          Learning Assessment:   :       Learning Assessment 7/10/2014   PRIMARY LEARNER Patient   HIGHEST LEVEL OF EDUCATION - PRIMARY LEARNER  GRADUATED HIGH SCHOOL OR GED   BARRIERS PRIMARY LEARNER NONE   PRIMARY LANGUAGE ENGLISH   LEARNER PREFERENCE PRIMARY READING   ANSWERED BY patient        Depression Screening:   :       3 most recent PHQ Screens 3/9/2023   Little interest or pleasure in doing things Not at all   Feeling down, depressed, irritable, or hopeless Not at all   Total Score PHQ 2 0        Fall Risk Assessment:   :       Fall Risk Assessment, last 12 mths 2021   Able to walk?  Yes   Fall in past 12 months? 0   Do you feel unsteady? 0   Are you worried about falling 0        Abuse Screening:   :       Abuse Screening Questionnaire 6/29/2022 6/18/2020   Do you ever feel afraid of your partner? N N   Are you in a relationship with someone who physically or mentally threatens you? N N   Is it safe for you to go home?  Y Y        ADL Screening:   :       ADL Assessment 6/18/2020   Feeding yourself No Help Needed   Getting from bed to chair No Help Needed   Getting dressed No Help Needed   Bathing or showering No Help Needed   Walk across the room (includes cane/walker) No Help Needed   Using the telphone No Help Needed   Taking your medications No Help Needed   Preparing meals No Help Needed   Managing money (expenses/bills) No Help Needed   Moderately strenuous housework (laundry) No Help Needed   Shopping for personal items (toiletries/medicines) No Help Needed   Shopping for groceries No Help Needed   Driving No Help Needed   Climbing a flight of stairs No Help Needed   Getting to places beyond walking distances No Help Needed

## 2023-03-10 LAB
BUN SERPL-MCNC: 10 MG/DL (ref 8–27)
BUN/CREAT SERPL: 11 (ref 12–28)
CALCIUM SERPL-MCNC: 9.3 MG/DL (ref 8.7–10.3)
CHLORIDE SERPL-SCNC: 108 MMOL/L (ref 96–106)
CO2 SERPL-SCNC: 26 MMOL/L (ref 20–29)
CREAT SERPL-MCNC: 0.89 MG/DL (ref 0.57–1)
EGFRCR SERPLBLD CKD-EPI 2021: 73 ML/MIN/1.73
GLUCOSE SERPL-MCNC: 93 MG/DL (ref 70–99)
POTASSIUM SERPL-SCNC: 4.7 MMOL/L (ref 3.5–5.2)
SODIUM SERPL-SCNC: 150 MMOL/L (ref 134–144)

## 2023-03-11 NOTE — PROGRESS NOTES
Message sent to patient by My Chart:  Your labs showed normal potassium and kidney tests. Your sodium and chloride were high due to being dehydrated. Try to drink more water.     Dr. Mariusz William

## 2023-03-16 NOTE — PROGRESS NOTES
Coordination of Care  1. Have you been to the ER, urgent care clinic since your last visit? Hospitalized since your last visit? No    2. Have you seen or consulted any other health care providers outside of the [address] [place] [address] since your last visit? Include any pap smears or colon screening.  No    Medications  Medication Reconciliation Performed: yes  Patient does not need refills     Learning Assessment Complete? yes  Results for orders placed or performed in visit on 07/20/17   AMB POC URINALYSIS DIP STICK MANUAL W/O MICRO   Result Value Ref Range    Color (UA POC) Yellow     Clarity (UA POC) Slightly Cloudy     Glucose (UA POC) Negative Negative    Bilirubin (UA POC) Negative Negative    Ketones (UA POC) Negative Negative    Specific gravity (UA POC) 1.025 1.001 - 1.035    Blood (UA POC) Negative Negative    pH (UA POC) 6 4.6 - 8.0    Protein (UA POC) Negative Negative mg/dL    Urobilinogen (UA POC) 1 mg/dL 0.2 - 1    Nitrites (UA POC) Negative Negative    Leukocyte esterase (UA POC) 1+ Negative 1 2  used

## 2023-04-13 DIAGNOSIS — L20.9 ATOPIC DERMATITIS, UNSPECIFIED TYPE: Primary | ICD-10-CM

## 2023-04-13 RX ORDER — MOMETASONE FUROATE 1 MG/G
CREAM TOPICAL
Qty: 60 G | Refills: 2 | Status: SHIPPED | OUTPATIENT
Start: 2023-04-13

## 2023-04-24 DIAGNOSIS — E78.00 HYPERCHOLESTEROLEMIA: Primary | ICD-10-CM

## 2023-04-28 ENCOUNTER — TELEPHONE (OUTPATIENT)
Dept: INTERNAL MEDICINE CLINIC | Age: 63
End: 2023-04-28

## 2023-04-28 DIAGNOSIS — R73.03 PREDIABETES: Primary | ICD-10-CM

## 2023-04-28 NOTE — TELEPHONE ENCOUNTER
I called the patient and verified them by name and date of birth. She just wanted to let Dr. Jerral Hamman know she had a fall but she is okay. There is some bruising & swelling on her wrist. The swelling has gone down. There is also some bruising on the right leg. I informed her that if she had no major injures she should be okay but if she starts having severe pain over the weekend go to the urgent care for evaluation. She stated understanding and had no further questions.

## 2023-06-20 RX ORDER — POTASSIUM CHLORIDE 3 G/15ML
SOLUTION ORAL
Qty: 113 ML | Refills: 0 | Status: SHIPPED | OUTPATIENT
Start: 2023-06-20

## 2023-06-20 NOTE — TELEPHONE ENCOUNTER
PCP: Evert Morales MD     Last appt:  3/9/2023      Future Appointments   Date Time Provider Olga Lidia Bill   9/13/2023  2:20 PM vEert Morales MD [facility] AMB          Requested Prescriptions     Pending Prescriptions Disp Refills    Potassium Chloride 40 MEQ/15ML (20%) SOLN 113 mL 0     Sig: take 3.75 mls daily

## 2023-07-05 RX ORDER — ALBUTEROL SULFATE 90 UG/1
AEROSOL, METERED RESPIRATORY (INHALATION)
Qty: 18 G | Refills: 5 | Status: SHIPPED | OUTPATIENT
Start: 2023-07-05

## 2023-07-05 NOTE — TELEPHONE ENCOUNTER
PCP: Debi Lawton MD     Last appt: 3/9/2023    Future Appointments   Date Time Provider [address]   9/13/2023  2:20 PM Debi Lawton MD [facility] AMB          Requested Prescriptions     Pending Prescriptions Disp Refills    albuterol sulfate HFA (PROVENTIL;VENTOLIN;PROAIR) 108 (90 Base) MCG/ACT inhaler 18 g 2     Sig: inhale 2 puffs every 6 hours as needed for wheezing

## 2023-08-16 LAB
25(OH)D3+25(OH)D2 SERPL-MCNC: 59.5 NG/ML (ref 30–100)
ALBUMIN SERPL-MCNC: 4.2 G/DL (ref 3.9–4.9)
ALBUMIN/GLOB SERPL: 1.4 {RATIO} (ref 1.2–2.2)
ALP SERPL-CCNC: 126 IU/L (ref 44–121)
ALT SERPL-CCNC: 10 IU/L (ref 0–32)
AST SERPL-CCNC: 14 IU/L (ref 0–40)
BASOPHILS # BLD AUTO: 0.1 X10E3/UL (ref 0–0.2)
BASOPHILS NFR BLD AUTO: 1 %
BILIRUB SERPL-MCNC: <0.2 MG/DL (ref 0–1.2)
BUN SERPL-MCNC: 11 MG/DL (ref 8–27)
BUN/CREAT SERPL: 15 (ref 12–28)
CALCIUM SERPL-MCNC: 9.3 MG/DL (ref 8.7–10.3)
CHLORIDE SERPL-SCNC: 102 MMOL/L (ref 96–106)
CHOLEST SERPL-MCNC: 199 MG/DL (ref 100–199)
CO2 SERPL-SCNC: 27 MMOL/L (ref 20–29)
CREAT SERPL-MCNC: 0.73 MG/DL (ref 0.57–1)
EGFRCR SERPLBLD CKD-EPI 2021: 92 ML/MIN/1.73
EOSINOPHIL # BLD AUTO: 0.2 X10E3/UL (ref 0–0.4)
EOSINOPHIL NFR BLD AUTO: 3 %
ERYTHROCYTE [DISTWIDTH] IN BLOOD BY AUTOMATED COUNT: 18 % (ref 11.7–15.4)
EST. AVERAGE GLUCOSE BLD GHB EST-MCNC: 126 MG/DL
GLOBULIN SER CALC-MCNC: 3 G/DL (ref 1.5–4.5)
GLUCOSE SERPL-MCNC: 85 MG/DL (ref 70–99)
HBA1C MFR BLD: 6 % (ref 4.8–5.6)
HCT VFR BLD AUTO: 41.2 % (ref 34–46.6)
HDLC SERPL-MCNC: 56 MG/DL
HGB BLD-MCNC: 13 G/DL (ref 11.1–15.9)
IMM GRANULOCYTES # BLD AUTO: 0 X10E3/UL (ref 0–0.1)
IMM GRANULOCYTES NFR BLD AUTO: 0 %
LDLC SERPL CALC-MCNC: 117 MG/DL (ref 0–99)
LYMPHOCYTES # BLD AUTO: 2.3 X10E3/UL (ref 0.7–3.1)
LYMPHOCYTES NFR BLD AUTO: 32 %
MCH RBC QN AUTO: 22.8 PG (ref 26.6–33)
MCHC RBC AUTO-ENTMCNC: 31.6 G/DL (ref 31.5–35.7)
MCV RBC AUTO: 72 FL (ref 79–97)
MONOCYTES # BLD AUTO: 0.5 X10E3/UL (ref 0.1–0.9)
MONOCYTES NFR BLD AUTO: 7 %
NEUTROPHILS # BLD AUTO: 4.1 X10E3/UL (ref 1.4–7)
NEUTROPHILS NFR BLD AUTO: 57 %
PLATELET # BLD AUTO: 402 X10E3/UL (ref 150–450)
POTASSIUM SERPL-SCNC: 4.2 MMOL/L (ref 3.5–5.2)
PROT SERPL-MCNC: 7.2 G/DL (ref 6–8.5)
RBC # BLD AUTO: 5.71 X10E6/UL (ref 3.77–5.28)
SODIUM SERPL-SCNC: 143 MMOL/L (ref 134–144)
TRIGL SERPL-MCNC: 145 MG/DL (ref 0–149)
TSH SERPL DL<=0.005 MIU/L-ACNC: 1.75 UIU/ML (ref 0.45–4.5)
VLDLC SERPL CALC-MCNC: 26 MG/DL (ref 5–40)
WBC # BLD AUTO: 7.2 X10E3/UL (ref 3.4–10.8)

## 2023-08-19 PROBLEM — E87.6 HYPOKALEMIA: Status: RESOLVED | Noted: 2021-11-18 | Resolved: 2023-08-19

## 2023-08-28 ENCOUNTER — OFFICE VISIT (OUTPATIENT)
Age: 63
End: 2023-08-28

## 2023-08-28 ENCOUNTER — TELEPHONE (OUTPATIENT)
Facility: CLINIC | Age: 63
End: 2023-08-28

## 2023-08-28 VITALS
OXYGEN SATURATION: 97 % | HEART RATE: 67 BPM | SYSTOLIC BLOOD PRESSURE: 156 MMHG | BODY MASS INDEX: 37.91 KG/M2 | WEIGHT: 214 LBS | TEMPERATURE: 97.2 F | DIASTOLIC BLOOD PRESSURE: 86 MMHG

## 2023-08-28 DIAGNOSIS — J45.21 MILD INTERMITTENT ASTHMA WITH ACUTE EXACERBATION: Primary | ICD-10-CM

## 2023-08-28 RX ORDER — PREDNISONE 20 MG/1
TABLET ORAL
Qty: 10 TABLET | Refills: 0 | Status: SHIPPED | OUTPATIENT
Start: 2023-08-28

## 2023-08-28 NOTE — TELEPHONE ENCOUNTER
Pt called and stated that was cleaning a room that had harsh chemicals in it and it has messed with her breathing. Pt stated that she has a nebulizer from [address] [place] [address] and was trying to use it but when she cleaned it she messing up a sponge piece. Pt wanted to know if she could get a new nebulizer.  She is currently on prednisone but was trying to use her neb to help with her breathing

## 2023-08-28 NOTE — PROGRESS NOTES
Subjective: (As above and below)     The patient/guardian gave verbal consent to treat. Chief Complaint   Patient presents with    Other     cough     Florence Marmolejo is a 61 y.o. female who presents for evaluation of : cough. Symptom onset yesterday. Exposed to dust. Had increase in coughing and had to use inhaler . Put on prednisone at [facility] urgent care. Preceding illness: none. No other identified aggravating or alleviating factors. Symptoms are constant and overall unchanged. Denies: SOB, vomiting/diarrhea, rashes, fevers . Hx of asthma. Referred here for CXR; spoke to nurse on phone who recommended it. Review of Systems    Review of Systems - negative except as listed above    Reviewed PmHx, RxHx, FmHx, SocHx, AllgHx and updated in chart. Family History   Problem Relation Age of Onset    Hypertension Sister     Asthma Sister     Hypertension Sister     Asthma Sister     Asthma Sister     Asthma Sister     Other Father         did not know him    Asthma Mother     Hypertension Mother     Asthma Sister        Past Medical History:   Diagnosis Date    Abnormal uterine bleeding     Asthma     Hayfever     History of mammogram july 2013    History of mammography, screening 6/2013    Hypertension     Intraductal papilloma 8/14/2013    Iron deficiency anemia     Menopause     Other ill-defined conditions(799.89) 1981    blood transfusion hx--vaginal delivery    Pap smear for cervical cancer screening 2011    normal    Pap smear for cervical cancer screening 2012    Sinus disease     Thyroid disease     HYPOTHYROID    Unspecified adverse effect of anesthesia     HAD TO FORCE MOUTH OPEN WHEN PT SEDATED      Social History     Socioeconomic History    Marital status:      Spouse name: None    Number of children: None    Years of education: None    Highest education level: None   Tobacco Use    Smoking status: Never    Smokeless tobacco: Never   Substance and Sexual Activity    Alcohol use:  No

## 2023-08-28 NOTE — TELEPHONE ENCOUNTER
Thursday pt was exposed to chemicals at work, went to [facility]. They did a Covid test, which was negative and listened to chest. She did not have a chest x-ray. Feels okay when sitting but SOB when ambulating. Advise to  go to [facility] for evaluation and chest x-ray.

## 2023-09-01 ENCOUNTER — TELEPHONE (OUTPATIENT)
Facility: CLINIC | Age: 63
End: 2023-09-01

## 2023-09-01 NOTE — TELEPHONE ENCOUNTER
Called pt verified name and , informed pt to contact her insurance to find out which company accepts her insurance.

## 2023-09-11 RX ORDER — TRIAMCINOLONE ACETONIDE 1 MG/G
CREAM TOPICAL
Qty: 15 G | Refills: 0 | Status: SHIPPED | OUTPATIENT
Start: 2023-09-11

## 2023-09-13 ENCOUNTER — OFFICE VISIT (OUTPATIENT)
Facility: CLINIC | Age: 63
End: 2023-09-13
Payer: COMMERCIAL

## 2023-09-13 ENCOUNTER — TELEPHONE (OUTPATIENT)
Facility: CLINIC | Age: 63
End: 2023-09-13

## 2023-09-13 VITALS
SYSTOLIC BLOOD PRESSURE: 130 MMHG | TEMPERATURE: 98.3 F | RESPIRATION RATE: 16 BRPM | DIASTOLIC BLOOD PRESSURE: 80 MMHG | HEIGHT: 63 IN | BODY MASS INDEX: 38.45 KG/M2 | WEIGHT: 217 LBS | HEART RATE: 78 BPM | OXYGEN SATURATION: 93 %

## 2023-09-13 DIAGNOSIS — E03.9 ACQUIRED HYPOTHYROIDISM: ICD-10-CM

## 2023-09-13 DIAGNOSIS — E66.01 SEVERE OBESITY (HCC): ICD-10-CM

## 2023-09-13 DIAGNOSIS — I10 PRIMARY HYPERTENSION: Primary | ICD-10-CM

## 2023-09-13 DIAGNOSIS — J45.40 MODERATE PERSISTENT ASTHMA WITHOUT COMPLICATION: ICD-10-CM

## 2023-09-13 PROCEDURE — 99214 OFFICE O/P EST MOD 30 MIN: CPT | Performed by: INTERNAL MEDICINE

## 2023-09-13 PROCEDURE — 3075F SYST BP GE 130 - 139MM HG: CPT | Performed by: INTERNAL MEDICINE

## 2023-09-13 PROCEDURE — 3079F DIAST BP 80-89 MM HG: CPT | Performed by: INTERNAL MEDICINE

## 2023-09-13 SDOH — ECONOMIC STABILITY: FOOD INSECURITY: WITHIN THE PAST 12 MONTHS, THE FOOD YOU BOUGHT JUST DIDN'T LAST AND YOU DIDN'T HAVE MONEY TO GET MORE.: SOMETIMES TRUE

## 2023-09-13 SDOH — ECONOMIC STABILITY: HOUSING INSECURITY
IN THE LAST 12 MONTHS, WAS THERE A TIME WHEN YOU DID NOT HAVE A STEADY PLACE TO SLEEP OR SLEPT IN A SHELTER (INCLUDING NOW)?: NO

## 2023-09-13 SDOH — ECONOMIC STABILITY: FOOD INSECURITY: WITHIN THE PAST 12 MONTHS, YOU WORRIED THAT YOUR FOOD WOULD RUN OUT BEFORE YOU GOT MONEY TO BUY MORE.: SOMETIMES TRUE

## 2023-09-13 SDOH — ECONOMIC STABILITY: INCOME INSECURITY: HOW HARD IS IT FOR YOU TO PAY FOR THE VERY BASICS LIKE FOOD, HOUSING, MEDICAL CARE, AND HEATING?: SOMEWHAT HARD

## 2023-09-13 NOTE — PROGRESS NOTES
Chief Complaint   Patient presents with    Hypertension    Cholesterol Problem    Hypothyroidism     3A       HISTORY OF PRESENT ILLNESS  Marian Edwards is a 61 y.o. female    Presents for 6 month follow up evaluation. She has HTN, hypothyroidism, asthma, allergic rhinitis, atopic dermatitis, and generalized anxiety disorder. Had asthma attack 2 weeks ago after exposure to bleach and other cleaning agents at work. Seen at [facility] and prescribed prednisone. Later seen at [facility] [address]  Breathing back to baseline. Doctor at Urgent Care recommended she wear a N95 mask and goggles at work when she is around noxious cleaning agents. Needs a nebulizer for home use. Borrowed her sister's for last asthma flare. Denies headaches, CP, SOB, dizziness, heart palpitations, muscle cramps, or leg swelling.     Patient Active Problem List   Diagnosis    Hypothyroidism    Incidental pulmonary nodule, less than or equal to 3mm    Hypertension    Seborrhea    Vitamin D deficiency    Atopic dermatitis    Severe obesity ([address])    Asthma    Prediabetes    Hypercholesterolemia    Allergic rhinitis    Generalized anxiety disorder     Past Medical History:   Diagnosis Date    Abnormal uterine bleeding     Asthma     Hayfever     History of mammogram july 2013    History of mammography, screening 6/2013    Hypertension     Intraductal papilloma 8/14/2013    Iron deficiency anemia     Menopause     Other ill-defined conditions(799.89) 1981    blood transfusion hx--vaginal delivery    Pap smear for cervical cancer screening 2011    normal    Pap smear for cervical cancer screening 2012    Sinus disease     Thyroid disease     HYPOTHYROID    Unspecified adverse effect of anesthesia     HAD TO FORCE MOUTH OPEN WHEN PT SEDATED     Allergies   Allergen Reactions    Latex Rash    Macadamia Nut Oil Anaphylaxis     Throat swelling    Shellfish Allergy Angioedema     THROAT SWELLS    Lisinopril Swelling     Other

## 2023-09-13 NOTE — TELEPHONE ENCOUNTER
Called pt verified name and , informed pt that a claim has been filed for her nebulizer and we are waiting for approval.

## 2023-09-14 ENCOUNTER — TELEPHONE (OUTPATIENT)
Facility: CLINIC | Age: 63
End: 2023-09-14

## 2023-09-14 NOTE — TELEPHONE ENCOUNTER
Patient wants a call back about her equipment she needs to know who they are ordering it from the name [facility]

## 2023-09-15 NOTE — TELEPHONE ENCOUNTER
Called pt verified name and , informed pt that her nebulizer has been approved and shipped. Pt stated that she received it today.

## 2023-09-18 DIAGNOSIS — J45.40 MODERATE PERSISTENT ASTHMA WITHOUT COMPLICATION: Primary | ICD-10-CM

## 2023-09-18 DIAGNOSIS — R42 VERTIGO: ICD-10-CM

## 2023-09-18 RX ORDER — MECLIZINE HYDROCHLORIDE 25 MG/1
25 TABLET ORAL 3 TIMES DAILY PRN
Qty: 30 TABLET | Refills: 0 | Status: SHIPPED | OUTPATIENT
Start: 2023-09-18 | End: 2023-09-28

## 2023-09-18 RX ORDER — ALBUTEROL SULFATE 2.5 MG/3ML
2.5 SOLUTION RESPIRATORY (INHALATION) EVERY 4 HOURS PRN
Qty: 120 EACH | Refills: 3 | Status: SHIPPED | OUTPATIENT
Start: 2023-09-18

## 2023-09-18 NOTE — TELEPHONE ENCOUNTER
Called pt verified name and . Pt stated that she has been experience vertigo since Saturday . Pt stated that she went to the ED for vertigo about a month or two ago. Pt also stated when she came in to the office last week Dr. Derick Morin asked if she was still having dizzy spells and pt stated that she had not at the time. Pt would like Rx meclizine called in to pharmacy and prefer to not have the chewable tablets.

## 2023-09-18 NOTE — TELEPHONE ENCOUNTER
PCP: Marielena Daniel MD     Last appt:  9/13/2023    Future Appointments   Date Time Provider [address]  [facility]   3/13/2024  2:00 PM Marielena Daniel MD [facility] AMB          Requested Prescriptions     Pending Prescriptions Disp Refills    albuterol (PROVENTIL) (2.5 MG/3ML) 0.083% nebulizer solution 120 each 3     Sig: Take 3 mLs by nebulization every 4 hours as needed for Wheezing or Shortness of Breath

## 2023-10-23 DIAGNOSIS — I10 PRIMARY HYPERTENSION: Primary | ICD-10-CM

## 2023-10-24 RX ORDER — SPIRONOLACTONE 25 MG/1
25 TABLET ORAL DAILY
Qty: 90 TABLET | Refills: 1 | Status: SHIPPED | OUTPATIENT
Start: 2023-10-24

## 2023-10-24 NOTE — TELEPHONE ENCOUNTER
PCP: Maxime Carrillo MD     Last appt:  9/13/2023      Future Appointments   Date Time Provider [address]   3/13/2024  2:00 PM Maxime Carrillo MD [facility] AMB          Requested Prescriptions     Pending Prescriptions Disp Refills    spironolactone (ALDACTONE) 25 MG tablet [Pharmacy Med Name: Spironolactone 25 MG Oral Tablet] 30 tablet 0     Sig: Take 1 tablet by mouth once daily

## 2023-10-26 ENCOUNTER — TELEPHONE (OUTPATIENT)
Facility: CLINIC | Age: 63
End: 2023-10-26

## 2023-10-26 NOTE — TELEPHONE ENCOUNTER
2 weeks ago she felt something sticking her, saw a quarter size lesion. Believes it was shingles, had both of the vaccines. Slowly going away, cover during day when she is at work. Place is still moist. She didn't know if there was something she could take or put on it. Informed not to cover, let air get to the area. Uses [facility] [employer].

## 2023-10-27 NOTE — TELEPHONE ENCOUNTER
Tell her to use calamine lotion on it. Calamine lotion helps to reduce itching and has a cooling effect. If the rash does not improve at all in a week, then schedule an appointment to be seen.

## 2023-11-30 ENCOUNTER — TELEPHONE (OUTPATIENT)
Facility: CLINIC | Age: 63
End: 2023-11-30

## 2023-11-30 NOTE — TELEPHONE ENCOUNTER
Pt got a letter from her insurance company stating they made a change and her medications budesonide-formoterol, fumarate dihydrate, fluticasone-salmeterol, diskus/HFA, BREO, ellipta.  She would like to know what to do

## 2023-12-01 NOTE — TELEPHONE ENCOUNTER
Tell her to schedule an appointment with Dr. Chay Finley about medications and bring the letter to clinic when she comes for her appointment.

## 2023-12-05 ENCOUNTER — OFFICE VISIT (OUTPATIENT)
Facility: CLINIC | Age: 63
End: 2023-12-05
Payer: COMMERCIAL

## 2023-12-05 VITALS
BODY MASS INDEX: 37.1 KG/M2 | RESPIRATION RATE: 16 BRPM | WEIGHT: 209.4 LBS | TEMPERATURE: 98.2 F | SYSTOLIC BLOOD PRESSURE: 113 MMHG | HEART RATE: 101 BPM | OXYGEN SATURATION: 90 % | DIASTOLIC BLOOD PRESSURE: 74 MMHG | HEIGHT: 63 IN

## 2023-12-05 DIAGNOSIS — J45.40 MODERATE PERSISTENT ASTHMA WITHOUT COMPLICATION: Primary | ICD-10-CM

## 2023-12-05 PROCEDURE — 3074F SYST BP LT 130 MM HG: CPT | Performed by: INTERNAL MEDICINE

## 2023-12-05 PROCEDURE — 99213 OFFICE O/P EST LOW 20 MIN: CPT | Performed by: INTERNAL MEDICINE

## 2023-12-05 PROCEDURE — 3078F DIAST BP <80 MM HG: CPT | Performed by: INTERNAL MEDICINE

## 2023-12-14 DIAGNOSIS — E03.9 ACQUIRED HYPOTHYROIDISM: Primary | ICD-10-CM

## 2023-12-14 RX ORDER — LEVOTHYROXINE SODIUM 0.05 MG/1
TABLET ORAL
Qty: 90 TABLET | Refills: 3 | Status: SHIPPED | OUTPATIENT
Start: 2023-12-14

## 2023-12-14 NOTE — TELEPHONE ENCOUNTER
PCP: Elissa Chambers MD     Last appt:  12/5/2023      Future Appointments   Date Time Provider [address]   3/13/2024  2:00 PM Elissa Chambers MD [facility] AMB          Requested Prescriptions     Pending Prescriptions Disp Refills    levothyroxine (SYNTHROID) 50 MCG tablet [Pharmacy Med Name: Levothyroxine Sodium 50 MCG Oral Tablet] 90 tablet 0     Sig: TAKE 1 TABLET BY MOUTH ONCE DAILY BEFORE BREAKFAST

## 2024-02-23 ENCOUNTER — TELEPHONE (OUTPATIENT)
Facility: CLINIC | Age: 64
End: 2024-02-23

## 2024-02-23 DIAGNOSIS — J45.40 MODERATE PERSISTENT ASTHMA WITHOUT COMPLICATION: Primary | ICD-10-CM

## 2024-02-23 RX ORDER — FLUTICASONE FUROATE AND VILANTEROL 200; 25 UG/1; UG/1
1 POWDER RESPIRATORY (INHALATION) DAILY
Qty: 60 EACH | Refills: 3 | Status: SHIPPED | OUTPATIENT
Start: 2024-02-23

## 2024-02-23 NOTE — TELEPHONE ENCOUNTER
Caller requests Refill of:  budesonide-formoterol (SYMBICORT) 160-4.5 MCG/ACT AERO  (pt stated that this is no longer covered and needs breo inhaler now)       Please send to:  [place] [address] - P [phone] - F [phone] [[id number]]       Visit Appointment History:  Future Appointments:  Future Appointments   Date Time Provider Department Center   3/13/2024  2:00 PM Mildred Peralta MD [facility] AMB         Last Appointment With Me:  12/5/2023         Caller confirmed instructions and dosages as correct.    Caller was advised that Meds will be refilled as soon as possible, however there can be a 48-72 business hour turn around on refill requests.

## 2024-02-28 RX ORDER — AMLODIPINE BESYLATE 10 MG/1
TABLET ORAL
Qty: 90 TABLET | Refills: 3 | Status: SHIPPED | OUTPATIENT
Start: 2024-02-28

## 2024-02-28 NOTE — TELEPHONE ENCOUNTER
PCP: Mildred Peralta MD     Last appt:  12/5/2023      Future Appointments   Date Time Provider Department Center   3/13/2024  2:00 PM Mildred Peralta MD [facility] AMB          Requested Prescriptions     Pending Prescriptions Disp Refills    amLODIPine (NORVASC) 10 MG tablet [Pharmacy Med Name: amLODIPine Besylate 10 MG Oral Tablet] 90 tablet 0     Sig: TAKE 1 TABLET BY MOUTH ONCE DAILY FOR HIGH BLOOD PRESSURE

## 2024-03-13 ENCOUNTER — OFFICE VISIT (OUTPATIENT)
Facility: CLINIC | Age: 64
End: 2024-03-13
Payer: COMMERCIAL

## 2024-03-13 VITALS
WEIGHT: 213.4 LBS | TEMPERATURE: 97.8 F | OXYGEN SATURATION: 95 % | RESPIRATION RATE: 16 BRPM | DIASTOLIC BLOOD PRESSURE: 88 MMHG | SYSTOLIC BLOOD PRESSURE: 149 MMHG | HEART RATE: 72 BPM | BODY MASS INDEX: 37.81 KG/M2 | HEIGHT: 63 IN

## 2024-03-13 DIAGNOSIS — L20.84 INTRINSIC ATOPIC DERMATITIS: ICD-10-CM

## 2024-03-13 DIAGNOSIS — R73.03 PREDIABETES: ICD-10-CM

## 2024-03-13 DIAGNOSIS — E03.9 ACQUIRED HYPOTHYROIDISM: ICD-10-CM

## 2024-03-13 DIAGNOSIS — E78.00 HYPERCHOLESTEROLEMIA: ICD-10-CM

## 2024-03-13 DIAGNOSIS — E55.9 VITAMIN D DEFICIENCY: ICD-10-CM

## 2024-03-13 DIAGNOSIS — I10 PRIMARY HYPERTENSION: Primary | ICD-10-CM

## 2024-03-13 PROCEDURE — 3077F SYST BP >= 140 MM HG: CPT | Performed by: INTERNAL MEDICINE

## 2024-03-13 PROCEDURE — 3079F DIAST BP 80-89 MM HG: CPT | Performed by: INTERNAL MEDICINE

## 2024-03-13 PROCEDURE — 99214 OFFICE O/P EST MOD 30 MIN: CPT | Performed by: INTERNAL MEDICINE

## 2024-03-13 RX ORDER — FLUOCINONIDE 0.5 MG/G
OINTMENT TOPICAL
Qty: 30 G | Refills: 1 | Status: SHIPPED | OUTPATIENT
Start: 2024-03-13 | End: 2024-03-20

## 2024-03-13 RX ORDER — SPIRONOLACTONE 25 MG/1
50 TABLET ORAL DAILY
Qty: 90 TABLET | Refills: 1
Start: 2024-03-13

## 2024-03-13 ASSESSMENT — PATIENT HEALTH QUESTIONNAIRE - PHQ9
SUM OF ALL RESPONSES TO PHQ QUESTIONS 1-9: 0
1. LITTLE INTEREST OR PLEASURE IN DOING THINGS: 0
SUM OF ALL RESPONSES TO PHQ QUESTIONS 1-9: 0
SUM OF ALL RESPONSES TO PHQ9 QUESTIONS 1 & 2: 0
2. FEELING DOWN, DEPRESSED OR HOPELESS: 0

## 2024-03-13 NOTE — PROGRESS NOTES
Chief Complaint   Patient presents with    Hypertension    Asthma       HISTORY OF PRESENT ILLNESS  Clarita Palencia is a 64 y.o. female    Presents for 6 month follow up evaluation of HTN and asthma.    Complains of 2 areas of eczema on the back that are itchy. No relief with mometasone cream. For eczema, uses triamcinolone 0.1% cream for face and mometasone 0.1% cream for the body. Triamcinolone cream works well.    Under increased stress because her mother has been staying for her and patient has been caring for her mother. Should be temporary; her sister will have mother move in with her soon.    Occ mild leg swelling. Denies headaches, CP, SOB, dizziness, or heart palpitations. Asthma controlled.    Due for mammogram. Had negative Cologuard in December 2021.    Patient Active Problem List   Diagnosis    Hypothyroidism    Incidental pulmonary nodule, less than or equal to 3mm    Hypertension    Seborrhea    Vitamin D deficiency    Atopic dermatitis    Severe obesity (H[facility])    Asthma    Prediabetes    Hypercholesterolemia    Allergic rhinitis    Generalized anxiety disorder     Past Medical History:   Diagnosis Date    Abnormal uterine bleeding     Asthma     Hayfever     History of mammogram july 2013    History of mammography, screening 6/2013    Hypertension     Intraductal papilloma 8/14/2013    Iron deficiency anemia     Menopause     Other ill-defined conditions(799.89) 1981    blood transfusion hx--vaginal delivery    Pap smear for cervical cancer screening 2011    normal    Pap smear for cervical cancer screening 2012    Sinus disease     Thyroid disease     HYPOTHYROID    Unspecified adverse effect of anesthesia     HAD TO FORCE MOUTH OPEN WHEN PT SEDATED     Allergies   Allergen Reactions    Latex Rash    Macadamia Nut Oil Anaphylaxis     Throat swelling    Shellfish Allergy Angioedema     THROAT SWELLS    Lisinopril Swelling     Other reaction(s): Not Reported This Time  Angioedema on 11/25/2010  LIPS 
ASSESSMENT   Feeding yourself No Help Needed   Getting from bed to chair No Help Needed   Getting dressed No Help Needed   Bathing or showering No Help Needed   Walk across the room (includes cane/walker) No Help Needed   Using the telphone No Help Needed   Taking your medications No Help Needed   Preparing meals No Help Needed   Managing money (expenses/bills) No Help Needed   Moderately strenuous housework (laundry) No Help Needed   Shopping for personal items (toiletries/medicines) No Help Needed   Shopping for groceries No Help Needed   Driving No Help Needed   Climbing a flight of stairs No Help Needed   Getting to places beyond walking distances No Help Needed         Medication reconciliation up to date and corrected with patient at this time.

## 2024-03-14 LAB
ANION GAP SERPL CALC-SCNC: 5 MMOL/L (ref 5–15)
BUN SERPL-MCNC: 16 MG/DL (ref 6–20)
BUN/CREAT SERPL: 22 (ref 12–20)
CALCIUM SERPL-MCNC: 9 MG/DL (ref 8.5–10.1)
CHLORIDE SERPL-SCNC: 106 MMOL/L (ref 97–108)
CO2 SERPL-SCNC: 31 MMOL/L (ref 21–32)
CREAT SERPL-MCNC: 0.74 MG/DL (ref 0.55–1.02)
GLUCOSE SERPL-MCNC: 92 MG/DL (ref 65–100)
POTASSIUM SERPL-SCNC: 3.7 MMOL/L (ref 3.5–5.1)
SODIUM SERPL-SCNC: 142 MMOL/L (ref 136–145)

## 2024-04-04 DIAGNOSIS — I10 PRIMARY HYPERTENSION: ICD-10-CM

## 2024-04-04 RX ORDER — SPIRONOLACTONE 50 MG/1
50 TABLET, FILM COATED ORAL DAILY
Qty: 90 TABLET | Refills: 1 | Status: SHIPPED | OUTPATIENT
Start: 2024-04-04

## 2024-04-04 RX ORDER — SPIRONOLACTONE 25 MG/1
50 TABLET ORAL DAILY
Qty: 90 TABLET | Refills: 1 | Status: CANCELLED | OUTPATIENT
Start: 2024-04-04

## 2024-04-04 NOTE — TELEPHONE ENCOUNTER
Caller requests Refill of:  spironolactone (ALDACTONE) 25 MG tablet       Please send to:  [facility]       Visit Appointment History:  Future Appointments:  Future Appointments   Date Time Provider Department Center   9/16/2024  2:00 PM Mildred Peralta MD [facility] BS AMB         Last Appointment With Me:  3/13/2024         Caller confirmed instructions and dosages as correct.    Caller was advised that Meds will be refilled as soon as possible, however there can be a 48-72 business hour turn around on refill requests.

## 2024-04-04 NOTE — TELEPHONE ENCOUNTER
PCP: Mildred Peralta MD     Last appt:  3/13/2024      Future Appointments   Date Time Provider Department Center   9/16/2024  2:00 PM Mildred Peralta MD [facility] AMB          Requested Prescriptions     Pending Prescriptions Disp Refills    spironolactone (ALDACTONE) 25 MG tablet 90 tablet 1     Sig: Take 2 tablets by mouth daily

## 2024-04-22 ENCOUNTER — TRANSCRIBE ORDERS (OUTPATIENT)
Facility: HOSPITAL | Age: 64
End: 2024-04-22

## 2024-04-22 DIAGNOSIS — Z12.31 SCREENING MAMMOGRAM FOR HIGH-RISK PATIENT: Primary | ICD-10-CM

## 2024-04-29 DIAGNOSIS — J45.40 MODERATE PERSISTENT ASTHMA WITHOUT COMPLICATION: ICD-10-CM

## 2024-04-29 RX ORDER — ALBUTEROL SULFATE 2.5 MG/3ML
2.5 SOLUTION RESPIRATORY (INHALATION) EVERY 4 HOURS PRN
Qty: 120 EACH | Refills: 3 | Status: SHIPPED | OUTPATIENT
Start: 2024-04-29

## 2024-04-29 RX ORDER — ALBUTEROL SULFATE 90 UG/1
AEROSOL, METERED RESPIRATORY (INHALATION)
Qty: 18 G | Refills: 5 | Status: SHIPPED | OUTPATIENT
Start: 2024-04-29

## 2024-04-29 NOTE — TELEPHONE ENCOUNTER
PCP: Mildred Peralta MD     Last appt: 3/13/2024    Future Appointments   Date Time Provider Department Center   5/8/2024  4:30 PM [facility]   9/16/2024  2:00 PM Mildred Peralta MD [facility] AMB          Requested Prescriptions     Pending Prescriptions Disp Refills    albuterol (PROVENTIL) (2.5 MG/3ML) 0.083% nebulizer solution 120 each 3     Sig: Take 3 mLs by nebulization every 4 hours as needed for Wheezing or Shortness of Breath    albuterol sulfate HFA (PROVENTIL;VENTOLIN;PROAIR) 108 (90 Base) MCG/ACT inhaler 18 g 5     Sig: inhale 2 puffs every 6 hours as needed for wheezing

## 2024-05-06 ENCOUNTER — TELEPHONE (OUTPATIENT)
Facility: CLINIC | Age: 64
End: 2024-05-06

## 2024-05-06 NOTE — TELEPHONE ENCOUNTER
Pt wants to know when her last annual wellness visit was so she can seclude another one for her work

## 2024-05-08 ENCOUNTER — HOSPITAL ENCOUNTER (OUTPATIENT)
Facility: HOSPITAL | Age: 64
Discharge: HOME | End: 2024-05-11
Payer: COMMERCIAL

## 2024-05-08 DIAGNOSIS — Z12.31 SCREENING MAMMOGRAM FOR HIGH-RISK PATIENT: ICD-10-CM

## 2024-05-08 PROCEDURE — 77067 SCR MAMMO BI INCL CAD: CPT

## 2024-05-14 ENCOUNTER — OFFICE VISIT (OUTPATIENT)
Facility: CLINIC | Age: 64
End: 2024-05-14
Payer: COMMERCIAL

## 2024-05-14 VITALS
WEIGHT: 217.8 LBS | OXYGEN SATURATION: 96 % | RESPIRATION RATE: 16 BRPM | TEMPERATURE: 98.3 F | HEART RATE: 69 BPM | BODY MASS INDEX: 38.59 KG/M2 | SYSTOLIC BLOOD PRESSURE: 129 MMHG | DIASTOLIC BLOOD PRESSURE: 77 MMHG | HEIGHT: 63 IN

## 2024-05-14 DIAGNOSIS — L30.9 HAND ECZEMA: Primary | ICD-10-CM

## 2024-05-14 DIAGNOSIS — E66.01 SEVERE OBESITY (BMI 35.0-39.9) WITH COMORBIDITY (HCC): ICD-10-CM

## 2024-05-14 PROCEDURE — 3074F SYST BP LT 130 MM HG: CPT | Performed by: INTERNAL MEDICINE

## 2024-05-14 PROCEDURE — 99213 OFFICE O/P EST LOW 20 MIN: CPT | Performed by: INTERNAL MEDICINE

## 2024-05-14 PROCEDURE — 3078F DIAST BP <80 MM HG: CPT | Performed by: INTERNAL MEDICINE

## 2024-05-14 NOTE — PROGRESS NOTES
Angelinejeff JULIETA Palencia  Identified pt with two pt identifiers(name and ).  Chief Complaint   Patient presents with    Skin Problem       1. Have you been to the ER, urgent care clinic since your last visit?  Hospitalized since your last visit? NO    2. Have you seen or consulted any other health care providers outside of the [facility] since your last visit?  Include any pap smears or colon screening. NO      Provider notified of reason for visit, vitals and flowsheets obtained on patients.     Patient received paperwork for advance directive during previous visit but has not completed at this time     Reviewed record In preparation for visit, huddled with provider and have obtained necessary documentation      Health Maintenance Due   Topic    Hepatitis B vaccine (3 of 3 - 19+ 3-dose series)       Wt Readings from Last 3 Encounters:   24 98.8 kg (217 lb 12.8 oz)   24 96.8 kg (213 lb 6.4 oz)   23 95 kg (209 lb 6.4 oz)     Temp Readings from Last 3 Encounters:   24 98.3 °F (36.8 °C) (Oral)   24 97.8 °F (36.6 °C) (Oral)   23 98.2 °F (36.8 °C) (Oral)     BP Readings from Last 3 Encounters:   24 129/77   24 (!) 149/88   23 113/74     Pulse Readings from Last 3 Encounters:   24 69   24 72   23 (!) 101          No data to display                  Learning Assessment:  :         2023     1:00 PM   [facility] AMB LEARNING ASSESSMENT   Primary Learner Patient   level of education GRADUATED HIGH SCHOOL OR GED   Primary Language ENGLISH   Learning Preference DEMONSTRATION   Answered By patient   Relationship to Learner SELF       Fall Risk Assessment:  :         2021     1:00 PM   Amb Fall Risk Assessment and TUG Test   Fall in past 12 months? 0   Able to walk? Yes       Abuse Screening:  :          No data to display                ADL Screening:  :         2023     1:00 PM   ADL ASSESSMENT   Feeding yourself No Help Needed   Getting 
AFFECTED AREA TWICE DAILY AS NEEDED FOR ITCHING 15 g 0    montelukast (SINGULAIR) 10 MG tablet Take 1 tablet by mouth daily 90 tablet 3    budesonide-formoterol (SYMBICORT) 160-4.5 MCG/ACT AERO INHALE 2 PUFFS BY MOUTH TWICE DAILY . APPOINTMENT REQUIRED FOR FUTURE REFILLS      vitamin D 25 MCG (1000 UT) CAPS Take by mouth daily      cyclobenzaprine (FLEXERIL) 10 MG tablet Take 1 tablet by mouth 3 times daily as needed      furosemide (LASIX) 20 MG tablet Take 1 tablet by mouth daily as needed      mometasone (ELOCON) 0.1 % cream APPLY TWICE DAILY       No current facility-administered medications for this visit.       Physical Exam    Physical Exam  /77 (Site: Left Upper Arm, Position: Sitting)   Pulse 69   Temp 98.3 °F (36.8 °C) (Oral)   Resp 16   Ht 1.6 m (5' 3\")   Wt 98.8 kg (217 lb 12.8 oz)   SpO2 96%   BMI 38.58 kg/m²     General: Well-developed and well-nourished, no distress.  HEENT:  Head normocephalic/atraumatic, no scleral icterus  Skin: On the right hand palmar surface under the 5th finger, there is a 3 x 4 cm area of scarring from desquamated blisters. Old blisters are noted at the palm side of the fingertips of the 4th and 5th digits. On the left hand, the fingertip of the 5th digit also has a desquamated blister.  Neurological: Alert and oriented.   Psychiatric: Normal mood and affect. Behavior is normal.     Assessment & Plan  1. Hand eczema.  The patient's blisters could potentially be attributed to hand eczema or contact dermatitis. A prescription for triamcinolone 0.5% cream has been issued. This is a group 3 steroid that is stronger than mometasone 0.1% cream (group 4) that has not been helping.   - Start fluocinonide (LIDEX) 0.05 % cream; Apply topically 2 times daily to affected areas on hands.  Dispense: 30 g; Refill: 0    2. Hypertension.  The patient is advised to take one 50-mg tablet of spironolactone daily.    3. Severe obesity.  Counseled on exercise. Will address further at

## 2024-07-15 DIAGNOSIS — L30.9 HAND ECZEMA: ICD-10-CM

## 2024-07-15 NOTE — TELEPHONE ENCOUNTER
PCP: Mildred Peralta MD     Last appt:  5/14/2024      Future Appointments   Date Time Provider Department Center   9/16/2024  2:00 PM Mildred Peralta MD [facility] BS AMB          Requested Prescriptions     Pending Prescriptions Disp Refills    fluocinonide (LIDEX) 0.05 % cream [Pharmacy Med Name: FLUOCINONIDE 0.05% CREA] 30 g 0     Sig: APPLY TWO TIMES A DAY TO AFFECTED AREA ON HANDS

## 2024-07-16 NOTE — TELEPHONE ENCOUNTER
PCP: Mildred Peralta MD     Last appt:  5/14/2024      Future Appointments   Date Time Provider Department Center   9/16/2024  2:00 PM Mildred Peralta MD [facility] BS AMB          Requested Prescriptions     Pending Prescriptions Disp Refills    triamcinolone (KENALOG) 0.1 % cream 15 g 0     Sig: APPLY A THIN LAYER OF CREAM TOPICALLY TO AFFECTED AREA TWICE DAILY AS NEEDED FOR ITCHING

## 2024-07-16 NOTE — TELEPHONE ENCOUNTER
Caller requests Refill of:  triamcinolone (KENALOG) 0.1 % cream       Please send to:  [facility] [address] [place] [address] [address] - P [phone] - F [phone] [[id number]]       Visit Appointment History:  Future Appointments:  Future Appointments   Date Time Provider Department Center   9/16/2024  2:00 PM Mildred Peralta MD [facility] BS AMB         Last Appointment With Me:  5/14/2024         Caller confirmed instructions and dosages as correct.    Caller was advised that Meds will be refilled as soon as possible, however there can be a 48-72 business hour turn around on refill requests.

## 2024-07-17 RX ORDER — TRIAMCINOLONE ACETONIDE 1 MG/G
CREAM TOPICAL
Qty: 15 G | Refills: 0 | Status: SHIPPED | OUTPATIENT
Start: 2024-07-17

## 2024-07-19 NOTE — TELEPHONE ENCOUNTER
Pt called and stated that she needs the triamcinolone (KENALOG) 0.025% cream not the one .1 that one hurts her

## 2024-07-31 DIAGNOSIS — F41.1 GENERALIZED ANXIETY DISORDER: ICD-10-CM

## 2024-07-31 NOTE — TELEPHONE ENCOUNTER
PCP: Mildred Peralta MD     Last appt:  5/14/2024      Future Appointments   Date Time Provider Department Center   9/16/2024  2:00 PM Mildred Peralta MD [facility] BS AMB          Requested Prescriptions     Pending Prescriptions Disp Refills    escitalopram (LEXAPRO) 5 MG tablet [Pharmacy Med Name: Escitalopram Oxalate 5 MG Oral Tablet] 90 tablet 0     Sig: Take 1 tablet by mouth once daily

## 2024-08-01 RX ORDER — ESCITALOPRAM OXALATE 5 MG/1
TABLET ORAL
Qty: 90 TABLET | Refills: 0 | Status: SHIPPED | OUTPATIENT
Start: 2024-08-01

## 2024-08-30 DIAGNOSIS — I10 PRIMARY HYPERTENSION: ICD-10-CM

## 2024-09-01 RX ORDER — SPIRONOLACTONE 50 MG/1
50 TABLET, FILM COATED ORAL DAILY
Qty: 90 TABLET | Refills: 1 | Status: SHIPPED | OUTPATIENT
Start: 2024-09-01

## 2024-09-05 RX ORDER — ALBUTEROL SULFATE 90 UG/1
AEROSOL, METERED RESPIRATORY (INHALATION)
Qty: 18 G | Refills: 5 | Status: SHIPPED | OUTPATIENT
Start: 2024-09-05

## 2024-09-05 NOTE — TELEPHONE ENCOUNTER
PCP: Mildred Peralta MD     Last appt:  5/14/2024      Future Appointments   Date Time Provider Department Center   9/16/2024  2:00 PM Mildred Peralta MD [facility] DEP          Requested Prescriptions     Pending Prescriptions Disp Refills    albuterol sulfate HFA (PROVENTIL;VENTOLIN;PROAIR) 108 (90 Base) MCG/ACT inhaler 18 g 5     Sig: inhale 2 puffs every 6 hours as needed for wheezing

## 2024-09-16 ENCOUNTER — OFFICE VISIT (OUTPATIENT)
Facility: CLINIC | Age: 64
End: 2024-09-16
Payer: COMMERCIAL

## 2024-09-16 VITALS
WEIGHT: 213.4 LBS | HEART RATE: 77 BPM | OXYGEN SATURATION: 95 % | DIASTOLIC BLOOD PRESSURE: 78 MMHG | SYSTOLIC BLOOD PRESSURE: 128 MMHG | BODY MASS INDEX: 37.81 KG/M2 | RESPIRATION RATE: 16 BRPM | HEIGHT: 63 IN | TEMPERATURE: 97.9 F

## 2024-09-16 DIAGNOSIS — Z12.11 SCREENING FOR COLON CANCER: ICD-10-CM

## 2024-09-16 DIAGNOSIS — E78.00 HYPERCHOLESTEROLEMIA: ICD-10-CM

## 2024-09-16 DIAGNOSIS — M19.011 PRIMARY OSTEOARTHRITIS OF RIGHT SHOULDER: ICD-10-CM

## 2024-09-16 DIAGNOSIS — E55.9 VITAMIN D DEFICIENCY: ICD-10-CM

## 2024-09-16 DIAGNOSIS — Z00.00 PHYSICAL EXAM: Primary | ICD-10-CM

## 2024-09-16 DIAGNOSIS — R73.03 PREDIABETES: ICD-10-CM

## 2024-09-16 DIAGNOSIS — I10 PRIMARY HYPERTENSION: ICD-10-CM

## 2024-09-16 DIAGNOSIS — L30.9 ECZEMA, UNSPECIFIED TYPE: ICD-10-CM

## 2024-09-16 DIAGNOSIS — J45.40 MODERATE PERSISTENT ASTHMA WITHOUT COMPLICATION: ICD-10-CM

## 2024-09-16 DIAGNOSIS — E03.9 ACQUIRED HYPOTHYROIDISM: ICD-10-CM

## 2024-09-16 PROCEDURE — 99396 PREV VISIT EST AGE 40-64: CPT | Performed by: INTERNAL MEDICINE

## 2024-09-16 PROCEDURE — 3074F SYST BP LT 130 MM HG: CPT | Performed by: INTERNAL MEDICINE

## 2024-09-16 PROCEDURE — 3078F DIAST BP <80 MM HG: CPT | Performed by: INTERNAL MEDICINE

## 2024-09-16 RX ORDER — TRIAMCINOLONE ACETONIDE 0.25 MG/G
CREAM TOPICAL
Qty: 15 G | Refills: 2 | Status: SHIPPED | OUTPATIENT
Start: 2024-09-16

## 2024-09-16 SDOH — ECONOMIC STABILITY: FOOD INSECURITY: WITHIN THE PAST 12 MONTHS, YOU WORRIED THAT YOUR FOOD WOULD RUN OUT BEFORE YOU GOT MONEY TO BUY MORE.: NEVER TRUE

## 2024-09-16 SDOH — ECONOMIC STABILITY: INCOME INSECURITY: HOW HARD IS IT FOR YOU TO PAY FOR THE VERY BASICS LIKE FOOD, HOUSING, MEDICAL CARE, AND HEATING?: NOT HARD AT ALL

## 2024-09-16 SDOH — ECONOMIC STABILITY: FOOD INSECURITY: WITHIN THE PAST 12 MONTHS, THE FOOD YOU BOUGHT JUST DIDN'T LAST AND YOU DIDN'T HAVE MONEY TO GET MORE.: NEVER TRUE

## 2024-09-17 LAB
25(OH)D3+25(OH)D2 SERPL-MCNC: 65.3 NG/ML (ref 30–100)
ALBUMIN SERPL-MCNC: 4.2 G/DL (ref 3.9–4.9)
ALP SERPL-CCNC: 119 IU/L (ref 44–121)
ALT SERPL-CCNC: 9 IU/L (ref 0–32)
AST SERPL-CCNC: 16 IU/L (ref 0–40)
BASOPHILS # BLD AUTO: 0.1 X10E3/UL (ref 0–0.2)
BASOPHILS NFR BLD AUTO: 1 %
BILIRUB SERPL-MCNC: 0.3 MG/DL (ref 0–1.2)
BUN SERPL-MCNC: 10 MG/DL (ref 8–27)
BUN/CREAT SERPL: 11 (ref 12–28)
CALCIUM SERPL-MCNC: 9.5 MG/DL (ref 8.7–10.3)
CHLORIDE SERPL-SCNC: 104 MMOL/L (ref 96–106)
CHOLEST SERPL-MCNC: 200 MG/DL (ref 100–199)
CO2 SERPL-SCNC: 27 MMOL/L (ref 20–29)
CREAT SERPL-MCNC: 0.91 MG/DL (ref 0.57–1)
EGFRCR SERPLBLD CKD-EPI 2021: 70 ML/MIN/1.73
EOSINOPHIL # BLD AUTO: 0.1 X10E3/UL (ref 0–0.4)
EOSINOPHIL NFR BLD AUTO: 2 %
ERYTHROCYTE [DISTWIDTH] IN BLOOD BY AUTOMATED COUNT: 17 % (ref 11.7–15.4)
GLOBULIN SER CALC-MCNC: 2.8 G/DL (ref 1.5–4.5)
GLUCOSE SERPL-MCNC: 105 MG/DL (ref 70–99)
HBA1C MFR BLD: 6.1 % (ref 4.8–5.6)
HCT VFR BLD AUTO: 40.3 % (ref 34–46.6)
HDLC SERPL-MCNC: 50 MG/DL
HGB BLD-MCNC: 11.3 G/DL (ref 11.1–15.9)
IMM GRANULOCYTES # BLD AUTO: 0 X10E3/UL (ref 0–0.1)
IMM GRANULOCYTES NFR BLD AUTO: 0 %
LDLC SERPL CALC-MCNC: 118 MG/DL (ref 0–99)
LYMPHOCYTES # BLD AUTO: 2.2 X10E3/UL (ref 0.7–3.1)
LYMPHOCYTES NFR BLD AUTO: 29 %
MCH RBC QN AUTO: 19.9 PG (ref 26.6–33)
MCHC RBC AUTO-ENTMCNC: 28 G/DL (ref 31.5–35.7)
MCV RBC AUTO: 71 FL (ref 79–97)
MONOCYTES # BLD AUTO: 0.6 X10E3/UL (ref 0.1–0.9)
MONOCYTES NFR BLD AUTO: 7 %
NEUTROPHILS # BLD AUTO: 4.7 X10E3/UL (ref 1.4–7)
NEUTROPHILS NFR BLD AUTO: 61 %
PLATELET # BLD AUTO: 432 X10E3/UL (ref 150–450)
POTASSIUM SERPL-SCNC: 3.9 MMOL/L (ref 3.5–5.2)
PROT SERPL-MCNC: 7 G/DL (ref 6–8.5)
RBC # BLD AUTO: 5.67 X10E6/UL (ref 3.77–5.28)
SODIUM SERPL-SCNC: 143 MMOL/L (ref 134–144)
TRIGL SERPL-MCNC: 184 MG/DL (ref 0–149)
TSH SERPL DL<=0.005 MIU/L-ACNC: 1.18 UIU/ML (ref 0.45–4.5)
VLDLC SERPL CALC-MCNC: 32 MG/DL (ref 5–40)
WBC # BLD AUTO: 7.7 X10E3/UL (ref 3.4–10.8)

## 2024-09-25 RX ORDER — MONTELUKAST SODIUM 10 MG/1
10 TABLET ORAL DAILY
Qty: 90 TABLET | Refills: 3 | Status: SHIPPED | OUTPATIENT
Start: 2024-09-25

## 2024-11-12 DIAGNOSIS — F41.1 GENERALIZED ANXIETY DISORDER: ICD-10-CM

## 2024-11-12 RX ORDER — ESCITALOPRAM OXALATE 5 MG/1
TABLET ORAL
Qty: 90 TABLET | Refills: 0 | Status: SHIPPED | OUTPATIENT
Start: 2024-11-12

## 2024-11-12 NOTE — TELEPHONE ENCOUNTER
PCP: Mildred Peralta MD     Last appt:  9/16/2024      Future Appointments   Date Time Provider Department Center   3/18/2025 11:10 AM Mildred Peralta MD [facility] DEP          Requested Prescriptions     Pending Prescriptions Disp Refills    escitalopram (LEXAPRO) 5 MG tablet [Pharmacy Med Name: Escitalopram Oxalate 5 MG Oral Tablet] 90 tablet 0     Sig: Take 1 tablet by mouth once daily

## 2024-11-24 ENCOUNTER — OFFICE VISIT (OUTPATIENT)
Age: 64
End: 2024-11-24

## 2024-11-24 VITALS
RESPIRATION RATE: 18 BRPM | HEART RATE: 96 BPM | SYSTOLIC BLOOD PRESSURE: 136 MMHG | DIASTOLIC BLOOD PRESSURE: 77 MMHG | WEIGHT: 215 LBS | BODY MASS INDEX: 38.09 KG/M2 | TEMPERATURE: 98.9 F | OXYGEN SATURATION: 95 %

## 2024-11-24 DIAGNOSIS — H66.001 NON-RECURRENT ACUTE SUPPURATIVE OTITIS MEDIA OF RIGHT EAR WITHOUT SPONTANEOUS RUPTURE OF TYMPANIC MEMBRANE: Primary | ICD-10-CM

## 2024-11-24 RX ORDER — VITAMIN B COMPLEX
1 CAPSULE ORAL DAILY
COMMUNITY

## 2024-12-02 ENCOUNTER — OFFICE VISIT (OUTPATIENT)
Age: 64
End: 2024-12-02

## 2024-12-02 VITALS
SYSTOLIC BLOOD PRESSURE: 149 MMHG | RESPIRATION RATE: 16 BRPM | BODY MASS INDEX: 38.62 KG/M2 | OXYGEN SATURATION: 97 % | WEIGHT: 218 LBS | TEMPERATURE: 98.3 F | HEART RATE: 70 BPM | DIASTOLIC BLOOD PRESSURE: 86 MMHG

## 2024-12-02 DIAGNOSIS — H60.503 ACUTE OTITIS EXTERNA OF BOTH EARS, UNSPECIFIED TYPE: Primary | ICD-10-CM

## 2024-12-02 RX ORDER — CIPROFLOXACIN AND DEXAMETHASONE 3; 1 MG/ML; MG/ML
4 SUSPENSION/ DROPS AURICULAR (OTIC) 2 TIMES DAILY
Qty: 7.5 ML | Refills: 0 | Status: SHIPPED | OUTPATIENT
Start: 2024-12-02 | End: 2024-12-09

## 2024-12-02 NOTE — PROGRESS NOTES
Clarita Palencia (:  1960) is a 64 y.o. female,Established patient, here for evaluation of the following chief complaint(s):  Ear Pain (Right ear pain. Pt was seen on  with the same symptoms. /X 4 days )      Assessment & Plan :  Visit Diagnoses and Associated Orders       Acute otitis externa of both ears, unspecified type    -  Primary    ciprofloxacin-dexAMETHasone (CIPRODEX) 0.3-0.1 % otic suspension [29904]                   Patient was seen today for bilateral ear itching which is consistent with an acute otitis externa  We will treat with Ciprodex eardrops, 4 drops in each ear twice daily for 7 days  If symptoms persist despite this, would recommend following up with ear nose and throat, referral provided  The muffled hearing that she is experiencing is consistent with a serous otitis media  Auto insufflation (plugging nose and gently exhaling), daily Flonase may be helpful       Subjective :    Ear Pain          64 y.o. female presents with symptoms of bilateral ear itching for the past 2 weeks.  She states that she was seen about a week ago and treated for a middle ear infection of the right ear, at that time she was having more pain associated with her symptoms.  This pain has improved, but she is having worsening itching on both sides, worse on the right.  Additionally she does have some sensation of fullness and muffled hearing on the right side which is not improved since treatment.  She denies fevers, chills, body aches or fatigue.  She denies any other significant upper respiratory symptoms, no stuffy or runny nose, postnasal drip, sore throat or cough.  She is not currently using anything for her symptoms.         Vitals:    24 1632   BP: (!) 149/86   Site: Left Upper Arm   Position: Sitting   Cuff Size: Large Adult   Pulse: 70   Resp: 16   Temp: 98.3 °F (36.8 °C)   SpO2: 97%   Weight: 98.9 kg (218 lb)       No results found for this visit on 24.      Objective   Physical

## 2024-12-02 NOTE — PATIENT INSTRUCTIONS
Patient was seen today for bilateral ear itching which is consistent with an acute otitis externa  We will treat with Ciprodex eardrops, 4 drops in each ear twice daily for 7 days  If symptoms persist despite this, would recommend following up with ear nose and throat, referral provided  The muffled hearing that she is experiencing is consistent with a serous otitis media  Auto insufflation (plugging nose and gently exhaling), daily Flonase may be helpful

## 2024-12-03 DIAGNOSIS — E03.9 ACQUIRED HYPOTHYROIDISM: ICD-10-CM

## 2024-12-03 NOTE — TELEPHONE ENCOUNTER
PCP: Mildred Peralta MD     Last appt:  9/16/2024      Future Appointments   Date Time Provider Department Center   3/18/2025 11:10 AM Mildred Peralta MD [facility] DEP          Requested Prescriptions     Pending Prescriptions Disp Refills    levothyroxine (SYNTHROID) 50 MCG tablet [Pharmacy Med Name: Levothyroxine Sodium 50 MCG Oral Tablet] 90 tablet 0     Sig: TAKE 1 TABLET BY MOUTH ONCE DAILY BEFORE BREAKFAST

## 2024-12-04 RX ORDER — LEVOTHYROXINE SODIUM 50 UG/1
TABLET ORAL
Qty: 90 TABLET | Refills: 3 | Status: SHIPPED | OUTPATIENT
Start: 2024-12-04

## 2024-12-10 NOTE — TELEPHONE ENCOUNTER
PCP: Mildred Peralta MD     Last appt:  9/16/2024      Future Appointments   Date Time Provider Department Center   3/18/2025 11:10 AM Mildred Peralta MD [facility]          Requested Prescriptions     Pending Prescriptions Disp Refills    triamcinolone (KENALOG) 0.1 % cream [Pharmacy Med Name: TRIAMCINOLONE ACETONIDE 0.1% CREA] 15 g 0     Sig: APPLY A THIN LAYER TO AFFECTED AREA TWICE DAILY AS NEEDED FOR ITCHING

## 2024-12-11 RX ORDER — TRIAMCINOLONE ACETONIDE 1 MG/G
CREAM TOPICAL
Qty: 15 G | Refills: 0 | Status: SHIPPED | OUTPATIENT
Start: 2024-12-11

## 2024-12-17 ENCOUNTER — TELEPHONE (OUTPATIENT)
Facility: CLINIC | Age: 64
End: 2024-12-17

## 2024-12-17 DIAGNOSIS — H66.90 ACUTE OTITIS MEDIA, UNSPECIFIED OTITIS MEDIA TYPE: Primary | ICD-10-CM

## 2024-12-18 NOTE — TELEPHONE ENCOUNTER
Spoke to pt verified name and . She stated she went to urgent care for ear \"swelling\". She was given antibiotics and ear drops for the ear infection but states her ears are still bothering her.     Referral pended.

## 2024-12-18 NOTE — TELEPHONE ENCOUNTER
Spoke to pt verified name and . I let her know referral information. She is aware she needs to call the number and make apt. No further questions.

## 2024-12-26 DIAGNOSIS — L30.9 HAND ECZEMA: ICD-10-CM

## 2024-12-26 RX ORDER — FLUOCINONIDE 0.5 MG/G
CREAM TOPICAL
Qty: 30 G | Refills: 0 | Status: SHIPPED | OUTPATIENT
Start: 2024-12-26

## 2024-12-27 RX ORDER — ALBUTEROL SULFATE 90 UG/1
INHALANT RESPIRATORY (INHALATION)
Qty: 18 G | Refills: 5 | Status: SHIPPED | OUTPATIENT
Start: 2024-12-27

## 2024-12-27 NOTE — TELEPHONE ENCOUNTER
PCP: Mildred Peralta MD     Last appt:  9/16/2024      Future Appointments   Date Time Provider Department Center   3/18/2025 11:10 AM Mildred Peralta MD [facility] DEP          Requested Prescriptions     Pending Prescriptions Disp Refills    albuterol sulfate HFA (PROVENTIL;VENTOLIN;PROAIR) 108 (90 Base) MCG/ACT inhaler 18 g 5     Sig: inhale 2 puffs every 6 hours as needed for wheezing

## 2024-12-31 LAB — NONINV COLON CA DNA+OCC BLD SCRN STL QL: NEGATIVE

## 2025-02-11 DIAGNOSIS — F41.1 GENERALIZED ANXIETY DISORDER: ICD-10-CM

## 2025-02-12 RX ORDER — ESCITALOPRAM OXALATE 5 MG/1
TABLET ORAL
Qty: 90 TABLET | Refills: 0 | Status: SHIPPED | OUTPATIENT
Start: 2025-02-12

## 2025-02-12 NOTE — TELEPHONE ENCOUNTER
PCP: Mildred Peralta MD     Last appt:  9/16/2024      Future Appointments   Date Time Provider Department Center   3/18/2025 11:10 AM Milrded Peralta MD [facility] DEP          Requested Prescriptions     Pending Prescriptions Disp Refills    escitalopram (LEXAPRO) 5 MG tablet [Pharmacy Med Name: Escitalopram Oxalate 5 MG Oral Tablet] 90 tablet 0     Sig: Take 1 tablet by mouth once daily

## 2025-03-06 DIAGNOSIS — J45.40 MODERATE PERSISTENT ASTHMA WITHOUT COMPLICATION: ICD-10-CM

## 2025-03-06 RX ORDER — TRIAMCINOLONE ACETONIDE 1 MG/G
CREAM TOPICAL
Qty: 15 G | Refills: 0 | Status: SHIPPED | OUTPATIENT
Start: 2025-03-06

## 2025-03-06 RX ORDER — FLUTICASONE FUROATE AND VILANTEROL TRIFENATATE 200; 25 UG/1; UG/1
1 POWDER RESPIRATORY (INHALATION) DAILY
Qty: 60 EACH | Refills: 3 | Status: SHIPPED | OUTPATIENT
Start: 2025-03-06

## 2025-03-06 NOTE — TELEPHONE ENCOUNTER
Future Appointments:  Future Appointments   Date Time Provider Department Center   3/18/2025 11:10 AM Mildred Peralta MD [facility] DEP        Last Appointment With Me:  9/16/2024     Requested Prescriptions     Pending Prescriptions Disp Refills    triamcinolone (KENALOG) 0.1 % cream [Pharmacy Med Name: TRIAMCINOLONE ACETONIDE 0.1% CREA] 15 g 0     Sig: APPLY A THIN LAYER TO AFFECTED AREA TWICE DAILY AS NEEDED FOR ITCHING    BREO ELLIPTA 200-25 MCG/ACT AEPB inhaler [Pharmacy Med Name: BREO ELLIPTA 200-25MCG/ACT AEPB] 60 each 3     Sig: INHALE 1 PUFF INTO THE LUNGS DAILY

## 2025-03-18 ENCOUNTER — OFFICE VISIT (OUTPATIENT)
Facility: CLINIC | Age: 65
End: 2025-03-18
Payer: COMMERCIAL

## 2025-03-18 VITALS
RESPIRATION RATE: 16 BRPM | TEMPERATURE: 98.1 F | HEART RATE: 73 BPM | DIASTOLIC BLOOD PRESSURE: 70 MMHG | BODY MASS INDEX: 37.56 KG/M2 | SYSTOLIC BLOOD PRESSURE: 130 MMHG | HEIGHT: 63 IN | OXYGEN SATURATION: 96 % | WEIGHT: 212 LBS

## 2025-03-18 DIAGNOSIS — R73.03 PREDIABETES: ICD-10-CM

## 2025-03-18 DIAGNOSIS — I10 PRIMARY HYPERTENSION: Primary | ICD-10-CM

## 2025-03-18 DIAGNOSIS — E03.9 ACQUIRED HYPOTHYROIDISM: ICD-10-CM

## 2025-03-18 DIAGNOSIS — E55.9 VITAMIN D DEFICIENCY: ICD-10-CM

## 2025-03-18 DIAGNOSIS — M19.011 PRIMARY OSTEOARTHRITIS OF RIGHT SHOULDER: ICD-10-CM

## 2025-03-18 DIAGNOSIS — E78.00 HYPERCHOLESTEROLEMIA: ICD-10-CM

## 2025-03-18 DIAGNOSIS — Z23 NEED FOR PROPHYLACTIC VACCINATION AGAINST STREPTOCOCCUS PNEUMONIAE (PNEUMOCOCCUS): ICD-10-CM

## 2025-03-18 PROCEDURE — 99214 OFFICE O/P EST MOD 30 MIN: CPT | Performed by: INTERNAL MEDICINE

## 2025-03-18 PROCEDURE — 1123F ACP DISCUSS/DSCN MKR DOCD: CPT | Performed by: INTERNAL MEDICINE

## 2025-03-18 PROCEDURE — 90471 IMMUNIZATION ADMIN: CPT | Performed by: INTERNAL MEDICINE

## 2025-03-18 PROCEDURE — 3075F SYST BP GE 130 - 139MM HG: CPT | Performed by: INTERNAL MEDICINE

## 2025-03-18 PROCEDURE — 90677 PCV20 VACCINE IM: CPT | Performed by: INTERNAL MEDICINE

## 2025-03-18 PROCEDURE — 3078F DIAST BP <80 MM HG: CPT | Performed by: INTERNAL MEDICINE

## 2025-03-18 SDOH — ECONOMIC STABILITY: FOOD INSECURITY: WITHIN THE PAST 12 MONTHS, YOU WORRIED THAT YOUR FOOD WOULD RUN OUT BEFORE YOU GOT MONEY TO BUY MORE.: NEVER TRUE

## 2025-03-18 SDOH — ECONOMIC STABILITY: FOOD INSECURITY: WITHIN THE PAST 12 MONTHS, THE FOOD YOU BOUGHT JUST DIDN'T LAST AND YOU DIDN'T HAVE MONEY TO GET MORE.: NEVER TRUE

## 2025-03-18 ASSESSMENT — PATIENT HEALTH QUESTIONNAIRE - PHQ9
SUM OF ALL RESPONSES TO PHQ QUESTIONS 1-9: 0
1. LITTLE INTEREST OR PLEASURE IN DOING THINGS: NOT AT ALL
2. FEELING DOWN, DEPRESSED OR HOPELESS: NOT AT ALL
SUM OF ALL RESPONSES TO PHQ QUESTIONS 1-9: 0

## 2025-03-18 NOTE — PROGRESS NOTES
After verbal order read back of Dr. Peralta, patient received Prevnar 20 in Left deltoid. [id number] Lot: [id number] Exp: 02.01.2026. Patient tolerated procedure without complaints and received VIS.  
walk?  Yes       Abuse Screening:  :          No data to display                ADL Screening:  :         12/5/2023     1:00 PM   ADL ASSESSMENT   Feeding yourself No Help Needed   Getting from bed to chair No Help Needed   Getting dressed No Help Needed   Bathing or showering No Help Needed   Walk across the room (includes cane/walker) No Help Needed   Using the telphone No Help Needed   Taking your medications No Help Needed   Preparing meals No Help Needed   Managing money (expenses/bills) No Help Needed   Moderately strenuous housework (laundry) No Help Needed   Shopping for personal items (toiletries/medicines) No Help Needed   Shopping for groceries No Help Needed   Driving No Help Needed   Climbing a flight of stairs No Help Needed   Getting to places beyond walking distances No Help Needed         Medication reconciliation up to date and corrected with patient at this time.       
and other individuals in attendance at the appointment consented to the use of AI, including the recording.

## 2025-03-19 LAB
BUN SERPL-MCNC: 12 MG/DL (ref 8–27)
BUN/CREAT SERPL: 15 (ref 12–28)
CALCIUM SERPL-MCNC: 9.4 MG/DL (ref 8.7–10.3)
CHLORIDE SERPL-SCNC: 103 MMOL/L (ref 96–106)
CO2 SERPL-SCNC: 27 MMOL/L (ref 20–29)
CREAT SERPL-MCNC: 0.82 MG/DL (ref 0.57–1)
EGFRCR SERPLBLD CKD-EPI 2021: 79 ML/MIN/1.73
GLUCOSE SERPL-MCNC: 122 MG/DL (ref 70–99)
POTASSIUM SERPL-SCNC: 4 MMOL/L (ref 3.5–5.2)
SODIUM SERPL-SCNC: 143 MMOL/L (ref 134–144)
TSH SERPL DL<=0.005 MIU/L-ACNC: 1.5 UIU/ML (ref 0.45–4.5)

## 2025-03-20 DIAGNOSIS — I10 PRIMARY HYPERTENSION: ICD-10-CM

## 2025-03-20 DIAGNOSIS — I10 PRIMARY HYPERTENSION: Primary | ICD-10-CM

## 2025-03-21 RX ORDER — AMLODIPINE BESYLATE 10 MG/1
10 TABLET ORAL DAILY
Qty: 90 TABLET | Refills: 3 | Status: SHIPPED | OUTPATIENT
Start: 2025-03-21

## 2025-03-21 RX ORDER — SPIRONOLACTONE 50 MG/1
50 TABLET, FILM COATED ORAL DAILY
Qty: 90 TABLET | Refills: 3 | Status: SHIPPED | OUTPATIENT
Start: 2025-03-21

## 2025-03-21 NOTE — TELEPHONE ENCOUNTER
PCP: Mildred Peralta MD     Last appt:  3/18/2025      Future Appointments   Date Time Provider Department Center   9/15/2025  8:30 AM LAB [facility] DEP   9/23/2025 11:30 AM Mildred Peralta MD [facility] DEP          Requested Prescriptions     Pending Prescriptions Disp Refills    amLODIPine (NORVASC) 10 MG tablet [Pharmacy Med Name: amLODIPine Besylate 10 MG Oral Tablet] 90 tablet 0     Sig: TAKE 1 TABLET BY MOUTH ONCE DAILY FOR HIGH BLOOD PRESSURE

## 2025-03-21 NOTE — TELEPHONE ENCOUNTER
PCP: Mildred Peralta MD     Last appt:  3/18/2025      Future Appointments   Date Time Provider Department Center   9/15/2025  8:30 AM LAB [facility] DEP   9/23/2025 11:30 AM Mildred Peralta MD [facility] DEP          Requested Prescriptions     Pending Prescriptions Disp Refills    spironolactone (ALDACTONE) 50 MG tablet [Pharmacy Med Name: SPIRONOLACTONE 50MG TABS] 90 tablet 1     Sig: TAKE ONE TABLET BY MOUTH EVERY DAY

## 2025-03-23 ENCOUNTER — RESULTS FOLLOW-UP (OUTPATIENT)
Facility: CLINIC | Age: 65
End: 2025-03-23

## 2025-05-07 DIAGNOSIS — L30.9 HAND ECZEMA: ICD-10-CM

## 2025-05-08 RX ORDER — FLUOCINONIDE 0.5 MG/G
CREAM TOPICAL
Qty: 30 G | Refills: 0 | Status: SHIPPED | OUTPATIENT
Start: 2025-05-08

## 2025-05-08 NOTE — TELEPHONE ENCOUNTER
PCP: Mildred Peralta MD     Last appt:  3/18/2025      Future Appointments   Date Time Provider Department Center   9/15/2025  8:30 AM LAB [facility] DEP   9/23/2025 11:30 AM Mildred Peralta MD [facility] DEP          Requested Prescriptions     Pending Prescriptions Disp Refills    fluocinonide (LIDEX) 0.05 % cream [Pharmacy Med Name: FLUOCINONIDE 0.05% CREA] 30 g 0     Sig: APPLY TWO TIMES A DAY TO AFFECTED AREAS ON HANDS       Yes

## 2025-05-21 DIAGNOSIS — F41.1 GENERALIZED ANXIETY DISORDER: ICD-10-CM

## 2025-05-22 NOTE — TELEPHONE ENCOUNTER
PCP: Mildred Peralta MD     Last appt:  3/18/2025      Future Appointments   Date Time Provider Department Center   9/15/2025  8:30 AM LAB [facility] DEP   9/23/2025 11:30 AM Mildred Peralta MD [facility] DEP          Requested Prescriptions     Pending Prescriptions Disp Refills    escitalopram (LEXAPRO) 5 MG tablet [Pharmacy Med Name: Escitalopram Oxalate 5 MG Oral Tablet] 90 tablet 0     Sig: Take 1 tablet by mouth once daily

## 2025-05-23 RX ORDER — ESCITALOPRAM OXALATE 5 MG/1
5 TABLET ORAL DAILY
Qty: 90 TABLET | Refills: 0 | Status: SHIPPED | OUTPATIENT
Start: 2025-05-23

## 2025-06-23 ENCOUNTER — TRANSCRIBE ORDERS (OUTPATIENT)
Facility: HOSPITAL | Age: 65
End: 2025-06-23

## 2025-06-23 DIAGNOSIS — Z12.31 VISIT FOR SCREENING MAMMOGRAM: Primary | ICD-10-CM

## 2025-06-27 ENCOUNTER — HOSPITAL ENCOUNTER (OUTPATIENT)
Age: 65
Discharge: HOME | End: 2025-06-30
Payer: COMMERCIAL

## 2025-06-27 VITALS — WEIGHT: 200 LBS | BODY MASS INDEX: 35.44 KG/M2 | HEIGHT: 63 IN

## 2025-06-27 DIAGNOSIS — Z12.31 VISIT FOR SCREENING MAMMOGRAM: ICD-10-CM

## 2025-06-27 PROCEDURE — 77063 BREAST TOMOSYNTHESIS BI: CPT

## 2025-07-08 DIAGNOSIS — J45.40 MODERATE PERSISTENT ASTHMA WITHOUT COMPLICATION: ICD-10-CM

## 2025-07-08 RX ORDER — FLUTICASONE FUROATE AND VILANTEROL 200; 25 UG/1; UG/1
POWDER RESPIRATORY (INHALATION)
Qty: 60 EACH | Refills: 3 | Status: SHIPPED | OUTPATIENT
Start: 2025-07-08

## 2025-07-08 NOTE — TELEPHONE ENCOUNTER
Insurance prefers 90 day       PCP: Mildred Peralta MD     Last appt:  3/18/2025      Future Appointments   Date Time Provider Department Center   9/15/2025  8:30 AM LAB [facility] DEP   9/23/2025 11:30 AM Mildred Peralta MD [facility] DEP          Requested Prescriptions     Pending Prescriptions Disp Refills    fluticasone furoate-vilanterol (BREO ELLIPTA) 200-25 MCG/ACT AEPB inhaler  3     Sig: INHALE 1 PUFF INTO THE LUNGS DAILY